# Patient Record
Sex: MALE | Race: BLACK OR AFRICAN AMERICAN | Employment: FULL TIME | URBAN - METROPOLITAN AREA
[De-identification: names, ages, dates, MRNs, and addresses within clinical notes are randomized per-mention and may not be internally consistent; named-entity substitution may affect disease eponyms.]

---

## 2021-11-06 ENCOUNTER — HOSPITAL ENCOUNTER (OUTPATIENT)
Age: 41
Setting detail: OBSERVATION
Discharge: HOME | End: 2021-11-08
Attending: EMERGENCY MEDICINE | Admitting: INTERNAL MEDICINE
Payer: COMMERCIAL

## 2021-11-06 ENCOUNTER — APPOINTMENT (OUTPATIENT)
Dept: GENERAL RADIOLOGY | Age: 41
End: 2021-11-06
Attending: EMERGENCY MEDICINE
Payer: COMMERCIAL

## 2021-11-06 DIAGNOSIS — R94.31 ABNORMAL EKG: ICD-10-CM

## 2021-11-06 DIAGNOSIS — E87.6 HYPOKALEMIA: Primary | ICD-10-CM

## 2021-11-06 DIAGNOSIS — R79.89 ELEVATED SERUM CREATININE: ICD-10-CM

## 2021-11-06 DIAGNOSIS — I16.0 HYPERTENSIVE URGENCY: ICD-10-CM

## 2021-11-06 LAB
BASOPHILS # BLD: 0 K/UL (ref 0–0.1)
BASOPHILS NFR BLD: 0 % (ref 0–1)
COMMENT, HOLDF: NORMAL
DIFFERENTIAL METHOD BLD: ABNORMAL
EOSINOPHIL # BLD: 0.1 K/UL (ref 0–0.4)
EOSINOPHIL NFR BLD: 2 % (ref 0–7)
ERYTHROCYTE [DISTWIDTH] IN BLOOD BY AUTOMATED COUNT: 13.9 % (ref 11.5–14.5)
HCT VFR BLD AUTO: 37.7 % (ref 36.6–50.3)
HGB BLD-MCNC: 11.8 G/DL (ref 12.1–17)
IMM GRANULOCYTES # BLD AUTO: 0 K/UL (ref 0–0.04)
IMM GRANULOCYTES NFR BLD AUTO: 0 % (ref 0–0.5)
LYMPHOCYTES # BLD: 3.1 K/UL (ref 0.8–3.5)
LYMPHOCYTES NFR BLD: 41 % (ref 12–49)
MCH RBC QN AUTO: 25.2 PG (ref 26–34)
MCHC RBC AUTO-ENTMCNC: 31.3 G/DL (ref 30–36.5)
MCV RBC AUTO: 80.4 FL (ref 80–99)
MONOCYTES # BLD: 0.8 K/UL (ref 0–1)
MONOCYTES NFR BLD: 10 % (ref 5–13)
NEUTS SEG # BLD: 3.6 K/UL (ref 1.8–8)
NEUTS SEG NFR BLD: 47 % (ref 32–75)
NRBC # BLD: 0 K/UL (ref 0–0.01)
NRBC BLD-RTO: 0 PER 100 WBC
PLATELET # BLD AUTO: 290 K/UL (ref 150–400)
PMV BLD AUTO: 10.5 FL (ref 8.9–12.9)
RBC # BLD AUTO: 4.69 M/UL (ref 4.1–5.7)
SAMPLES BEING HELD,HOLD: NORMAL
WBC # BLD AUTO: 7.5 K/UL (ref 4.1–11.1)

## 2021-11-06 PROCEDURE — 80053 COMPREHEN METABOLIC PANEL: CPT

## 2021-11-06 PROCEDURE — 96374 THER/PROPH/DIAG INJ IV PUSH: CPT

## 2021-11-06 PROCEDURE — 90715 TDAP VACCINE 7 YRS/> IM: CPT | Performed by: EMERGENCY MEDICINE

## 2021-11-06 PROCEDURE — 71046 X-RAY EXAM CHEST 2 VIEWS: CPT

## 2021-11-06 PROCEDURE — 74011250636 HC RX REV CODE- 250/636: Performed by: EMERGENCY MEDICINE

## 2021-11-06 PROCEDURE — 90471 IMMUNIZATION ADMIN: CPT

## 2021-11-06 PROCEDURE — 85025 COMPLETE CBC W/AUTO DIFF WBC: CPT

## 2021-11-06 PROCEDURE — 93005 ELECTROCARDIOGRAM TRACING: CPT

## 2021-11-06 PROCEDURE — 74011250637 HC RX REV CODE- 250/637: Performed by: EMERGENCY MEDICINE

## 2021-11-06 PROCEDURE — 84484 ASSAY OF TROPONIN QUANT: CPT

## 2021-11-06 PROCEDURE — 36415 COLL VENOUS BLD VENIPUNCTURE: CPT

## 2021-11-06 PROCEDURE — 99285 EMERGENCY DEPT VISIT HI MDM: CPT

## 2021-11-06 PROCEDURE — 83735 ASSAY OF MAGNESIUM: CPT

## 2021-11-06 RX ORDER — ERYTHROMYCIN 5 MG/G
OINTMENT OPHTHALMIC
Status: COMPLETED | OUTPATIENT
Start: 2021-11-06 | End: 2021-11-06

## 2021-11-06 RX ADMIN — TETANUS TOXOID, REDUCED DIPHTHERIA TOXOID AND ACELLULAR PERTUSSIS VACCINE, ADSORBED 0.5 ML: 5; 2.5; 8; 8; 2.5 SUSPENSION INTRAMUSCULAR at 22:44

## 2021-11-06 RX ADMIN — ERYTHROMYCIN: 5 OINTMENT OPHTHALMIC at 22:44

## 2021-11-07 ENCOUNTER — APPOINTMENT (OUTPATIENT)
Dept: ULTRASOUND IMAGING | Age: 41
End: 2021-11-07
Attending: INTERNAL MEDICINE
Payer: COMMERCIAL

## 2021-11-07 PROBLEM — I16.1 HYPERTENSIVE EMERGENCY: Status: ACTIVE | Noted: 2021-11-07

## 2021-11-07 LAB
ALBUMIN SERPL-MCNC: 4.2 G/DL (ref 3.5–5)
ALBUMIN/GLOB SERPL: 1.1 {RATIO} (ref 1.1–2.2)
ALP SERPL-CCNC: 73 U/L (ref 45–117)
ALT SERPL-CCNC: 24 U/L (ref 12–78)
ANION GAP SERPL CALC-SCNC: 5 MMOL/L (ref 5–15)
ANION GAP SERPL CALC-SCNC: 5 MMOL/L (ref 5–15)
APPEARANCE UR: CLEAR
AST SERPL-CCNC: 13 U/L (ref 15–37)
ATRIAL RATE: 70 BPM
BACTERIA URNS QL MICRO: NEGATIVE /HPF
BILIRUB SERPL-MCNC: 0.5 MG/DL (ref 0.2–1)
BILIRUB UR QL: NEGATIVE
BUN SERPL-MCNC: 17 MG/DL (ref 6–20)
BUN SERPL-MCNC: 22 MG/DL (ref 6–20)
BUN/CREAT SERPL: 12 (ref 12–20)
BUN/CREAT SERPL: 14 (ref 12–20)
CALCIUM SERPL-MCNC: 8.9 MG/DL (ref 8.5–10.1)
CALCIUM SERPL-MCNC: 9.1 MG/DL (ref 8.5–10.1)
CALCULATED P AXIS, ECG09: 65 DEGREES
CALCULATED R AXIS, ECG10: 68 DEGREES
CALCULATED T AXIS, ECG11: 17 DEGREES
CHLORIDE SERPL-SCNC: 107 MMOL/L (ref 97–108)
CHLORIDE SERPL-SCNC: 108 MMOL/L (ref 97–108)
CO2 SERPL-SCNC: 26 MMOL/L (ref 21–32)
CO2 SERPL-SCNC: 27 MMOL/L (ref 21–32)
COLOR UR: ABNORMAL
COMMENT, HOLDF: NORMAL
CREAT SERPL-MCNC: 1.37 MG/DL (ref 0.7–1.3)
CREAT SERPL-MCNC: 1.57 MG/DL (ref 0.7–1.3)
CREAT UR-MCNC: 134 MG/DL
DIAGNOSIS, 93000: NORMAL
EPITH CASTS URNS QL MICRO: ABNORMAL /LPF
GLOBULIN SER CALC-MCNC: 3.9 G/DL (ref 2–4)
GLUCOSE SERPL-MCNC: 102 MG/DL (ref 65–100)
GLUCOSE SERPL-MCNC: 85 MG/DL (ref 65–100)
GLUCOSE UR STRIP.AUTO-MCNC: NEGATIVE MG/DL
HGB UR QL STRIP: ABNORMAL
HYALINE CASTS URNS QL MICRO: ABNORMAL /LPF (ref 0–5)
KETONES UR QL STRIP.AUTO: NEGATIVE MG/DL
LEUKOCYTE ESTERASE UR QL STRIP.AUTO: NEGATIVE
MAGNESIUM SERPL-MCNC: 2.2 MG/DL (ref 1.6–2.4)
NITRITE UR QL STRIP.AUTO: NEGATIVE
P-R INTERVAL, ECG05: 144 MS
PH UR STRIP: 7 [PH] (ref 5–8)
POTASSIUM SERPL-SCNC: 3.2 MMOL/L (ref 3.5–5.1)
POTASSIUM SERPL-SCNC: 3.2 MMOL/L (ref 3.5–5.1)
PROT SERPL-MCNC: 8.1 G/DL (ref 6.4–8.2)
PROT UR STRIP-MCNC: ABNORMAL MG/DL
Q-T INTERVAL, ECG07: 400 MS
QRS DURATION, ECG06: 100 MS
QTC CALCULATION (BEZET), ECG08: 432 MS
RBC #/AREA URNS HPF: ABNORMAL /HPF (ref 0–5)
SAMPLES BEING HELD,HOLD: NORMAL
SODIUM SERPL-SCNC: 139 MMOL/L (ref 136–145)
SODIUM SERPL-SCNC: 139 MMOL/L (ref 136–145)
SODIUM UR-SCNC: 140 MMOL/L
SP GR UR REFRACTOMETRY: 1.01 (ref 1–1.03)
TROPONIN-HIGH SENSITIVITY: 29 NG/L (ref 0–76)
UR CULT HOLD, URHOLD: NORMAL
UROBILINOGEN UR QL STRIP.AUTO: 0.2 EU/DL (ref 0.2–1)
VENTRICULAR RATE, ECG03: 70 BPM
WBC URNS QL MICRO: ABNORMAL /HPF (ref 0–4)

## 2021-11-07 PROCEDURE — 65660000000 HC RM CCU STEPDOWN

## 2021-11-07 PROCEDURE — 84244 ASSAY OF RENIN: CPT

## 2021-11-07 PROCEDURE — 36415 COLL VENOUS BLD VENIPUNCTURE: CPT

## 2021-11-07 PROCEDURE — 74011250637 HC RX REV CODE- 250/637: Performed by: INTERNAL MEDICINE

## 2021-11-07 PROCEDURE — 76770 US EXAM ABDO BACK WALL COMP: CPT

## 2021-11-07 PROCEDURE — 82088 ASSAY OF ALDOSTERONE: CPT

## 2021-11-07 PROCEDURE — 74011250636 HC RX REV CODE- 250/636: Performed by: EMERGENCY MEDICINE

## 2021-11-07 PROCEDURE — 74011000250 HC RX REV CODE- 250: Performed by: FAMILY MEDICINE

## 2021-11-07 PROCEDURE — 82570 ASSAY OF URINE CREATININE: CPT

## 2021-11-07 PROCEDURE — 96366 THER/PROPH/DIAG IV INF ADDON: CPT

## 2021-11-07 PROCEDURE — 84300 ASSAY OF URINE SODIUM: CPT

## 2021-11-07 PROCEDURE — 74011250636 HC RX REV CODE- 250/636: Performed by: FAMILY MEDICINE

## 2021-11-07 PROCEDURE — 74011250637 HC RX REV CODE- 250/637: Performed by: EMERGENCY MEDICINE

## 2021-11-07 PROCEDURE — 96375 TX/PRO/DX INJ NEW DRUG ADDON: CPT

## 2021-11-07 PROCEDURE — 96365 THER/PROPH/DIAG IV INF INIT: CPT

## 2021-11-07 PROCEDURE — 81001 URINALYSIS AUTO W/SCOPE: CPT

## 2021-11-07 PROCEDURE — 96372 THER/PROPH/DIAG INJ SC/IM: CPT

## 2021-11-07 PROCEDURE — 74011250637 HC RX REV CODE- 250/637: Performed by: FAMILY MEDICINE

## 2021-11-07 PROCEDURE — 80048 BASIC METABOLIC PNL TOTAL CA: CPT

## 2021-11-07 RX ORDER — ENOXAPARIN SODIUM 100 MG/ML
40 INJECTION SUBCUTANEOUS DAILY
Status: DISCONTINUED | OUTPATIENT
Start: 2021-11-07 | End: 2021-11-08 | Stop reason: HOSPADM

## 2021-11-07 RX ORDER — SODIUM CHLORIDE 0.9 % (FLUSH) 0.9 %
5-40 SYRINGE (ML) INJECTION AS NEEDED
Status: DISCONTINUED | OUTPATIENT
Start: 2021-11-07 | End: 2021-11-08 | Stop reason: HOSPADM

## 2021-11-07 RX ORDER — HYDRALAZINE HYDROCHLORIDE 20 MG/ML
10 INJECTION INTRAMUSCULAR; INTRAVENOUS
Status: DISCONTINUED | OUTPATIENT
Start: 2021-11-07 | End: 2021-11-08 | Stop reason: HOSPADM

## 2021-11-07 RX ORDER — AMLODIPINE BESYLATE 5 MG/1
5 TABLET ORAL
Status: COMPLETED | OUTPATIENT
Start: 2021-11-07 | End: 2021-11-07

## 2021-11-07 RX ORDER — POTASSIUM CHLORIDE 750 MG/1
40 TABLET, FILM COATED, EXTENDED RELEASE ORAL
Status: COMPLETED | OUTPATIENT
Start: 2021-11-07 | End: 2021-11-07

## 2021-11-07 RX ORDER — AMLODIPINE BESYLATE 5 MG/1
10 TABLET ORAL DAILY
Status: DISCONTINUED | OUTPATIENT
Start: 2021-11-07 | End: 2021-11-07

## 2021-11-07 RX ORDER — CARVEDILOL 6.25 MG/1
6.25 TABLET ORAL 2 TIMES DAILY WITH MEALS
Status: DISCONTINUED | OUTPATIENT
Start: 2021-11-07 | End: 2021-11-07

## 2021-11-07 RX ORDER — HYDRALAZINE HYDROCHLORIDE 20 MG/ML
20 INJECTION INTRAMUSCULAR; INTRAVENOUS ONCE
Status: COMPLETED | OUTPATIENT
Start: 2021-11-07 | End: 2021-11-07

## 2021-11-07 RX ORDER — AMLODIPINE BESYLATE 5 MG/1
5 TABLET ORAL DAILY
Status: DISCONTINUED | OUTPATIENT
Start: 2021-11-07 | End: 2021-11-07

## 2021-11-07 RX ORDER — SODIUM CHLORIDE 0.9 % (FLUSH) 0.9 %
5-40 SYRINGE (ML) INJECTION EVERY 8 HOURS
Status: DISCONTINUED | OUTPATIENT
Start: 2021-11-07 | End: 2021-11-08 | Stop reason: HOSPADM

## 2021-11-07 RX ORDER — ONDANSETRON 2 MG/ML
4 INJECTION INTRAMUSCULAR; INTRAVENOUS
Status: DISCONTINUED | OUTPATIENT
Start: 2021-11-07 | End: 2021-11-08 | Stop reason: HOSPADM

## 2021-11-07 RX ORDER — ACETAMINOPHEN 325 MG/1
650 TABLET ORAL
Status: DISCONTINUED | OUTPATIENT
Start: 2021-11-07 | End: 2021-11-08 | Stop reason: HOSPADM

## 2021-11-07 RX ORDER — CARVEDILOL 12.5 MG/1
25 TABLET ORAL 2 TIMES DAILY WITH MEALS
Status: DISCONTINUED | OUTPATIENT
Start: 2021-11-07 | End: 2021-11-08 | Stop reason: HOSPADM

## 2021-11-07 RX ORDER — ACETAMINOPHEN 650 MG/1
650 SUPPOSITORY RECTAL
Status: DISCONTINUED | OUTPATIENT
Start: 2021-11-07 | End: 2021-11-08 | Stop reason: HOSPADM

## 2021-11-07 RX ORDER — HYDROCHLOROTHIAZIDE 25 MG/1
25 TABLET ORAL DAILY
Status: DISCONTINUED | OUTPATIENT
Start: 2021-11-07 | End: 2021-11-07

## 2021-11-07 RX ORDER — POTASSIUM CHLORIDE 750 MG/1
40 TABLET, FILM COATED, EXTENDED RELEASE ORAL DAILY
Status: COMPLETED | OUTPATIENT
Start: 2021-11-07 | End: 2021-11-08

## 2021-11-07 RX ORDER — ONDANSETRON 4 MG/1
4 TABLET, ORALLY DISINTEGRATING ORAL
Status: DISCONTINUED | OUTPATIENT
Start: 2021-11-07 | End: 2021-11-08 | Stop reason: HOSPADM

## 2021-11-07 RX ADMIN — POTASSIUM CHLORIDE 40 MEQ: 750 TABLET, FILM COATED, EXTENDED RELEASE ORAL at 00:26

## 2021-11-07 RX ADMIN — POTASSIUM CHLORIDE 40 MEQ: 750 TABLET, FILM COATED, EXTENDED RELEASE ORAL at 06:45

## 2021-11-07 RX ADMIN — POTASSIUM CHLORIDE 40 MEQ: 750 TABLET, FILM COATED, EXTENDED RELEASE ORAL at 13:11

## 2021-11-07 RX ADMIN — CARVEDILOL 6.25 MG: 6.25 TABLET, FILM COATED ORAL at 06:45

## 2021-11-07 RX ADMIN — CARVEDILOL 25 MG: 12.5 TABLET, FILM COATED ORAL at 17:26

## 2021-11-07 RX ADMIN — Medication 10 ML: at 13:12

## 2021-11-07 RX ADMIN — AMLODIPINE BESYLATE 5 MG: 5 TABLET ORAL at 00:26

## 2021-11-07 RX ADMIN — SODIUM CHLORIDE 5 MG/HR: 9 INJECTION, SOLUTION INTRAVENOUS at 18:38

## 2021-11-07 RX ADMIN — Medication 10 ML: at 06:00

## 2021-11-07 RX ADMIN — Medication 10 ML: at 22:00

## 2021-11-07 RX ADMIN — ENOXAPARIN SODIUM 40 MG: 100 INJECTION SUBCUTANEOUS at 08:44

## 2021-11-07 RX ADMIN — SODIUM CHLORIDE 5 MG/HR: 9 INJECTION, SOLUTION INTRAVENOUS at 12:00

## 2021-11-07 RX ADMIN — HYDRALAZINE HYDROCHLORIDE 20 MG: 20 INJECTION INTRAMUSCULAR; INTRAVENOUS at 00:50

## 2021-11-07 RX ADMIN — SODIUM CHLORIDE 0.5 MG/HR: 9 INJECTION, SOLUTION INTRAVENOUS at 07:00

## 2021-11-07 NOTE — ED PROVIDER NOTES
HPI   41 yo M presents with hypertension. Pt went to [facility] due to sawdust in eye. BP noted to be elevated. Sent to ER for further evaluation. Denies cp, sob, nausea, vomiting, diarrhea, fever, cough. Noted to have corneal abrasion on exam and given erythromycin ointment. Pt does not wear glasses or contacts. Unknown tetanus status. No past medical history on file. No past surgical history on file. No family history on file. Social History     Socioeconomic History    Marital status:      Spouse name: Not on file    Number of children: Not on file    Years of education: Not on file    Highest education level: Not on file   Occupational History    Not on file   Tobacco Use    Smoking status: Never Smoker    Smokeless tobacco: Not on file   Substance and Sexual Activity    Alcohol use: Yes     Comment: social    Drug use: Never    Sexual activity: Not on file   Other Topics Concern    Not on file   Social History Narrative    Not on file     Social Determinants of Health     Financial Resource Strain:     Difficulty of Paying Living Expenses: Not on file   Food Insecurity:     Worried About Running Out of Food in the Last Year: Not on file    Dejah of Food in the Last Year: Not on file   Transportation Needs:     Lack of Transportation (Medical): Not on file    Lack of Transportation (Non-Medical):  Not on file   Physical Activity:     Days of Exercise per Week: Not on file    Minutes of Exercise per Session: Not on file   Stress:     Feeling of Stress : Not on file   Social Connections:     Frequency of Communication with Friends and Family: Not on file    Frequency of Social Gatherings with Friends and Family: Not on file    Attends [employer] Services: Not on file    Active Member of Clubs or Organizations: Not on file    Attends Club or Organization Meetings: Not on file    Marital Status: Not on file   Intimate Partner Violence:     Fear of Current or Ex-Partner: Not on file    Emotionally Abused: Not on file    Physically Abused: Not on file    Sexually Abused: Not on file   Housing Stability:     Unable to Pay for Housing in the Last Year: Not on file    Number of Places Lived in the Last Year: Not on file    Unstable Housing in the Last Year: Not on file         ALLERGIES: Patient has no known allergies. Review of Systems   Constitutional: Negative for chills and fever. Eyes: Positive for pain. Respiratory: Negative for cough and shortness of breath. Cardiovascular: Negative for chest pain. Gastrointestinal: Negative for abdominal pain, constipation, diarrhea, nausea and vomiting. Genitourinary: Negative for dysuria. Musculoskeletal: Negative for back pain. Skin: Negative for rash. Neurological: Negative for headaches. All other systems reviewed and are negative.       Vitals:    11/06/21 2047   BP: (!) 231/139   Pulse: 75   Resp: 18   Temp: 98 °F (36.7 °C)   SpO2: 99%   Weight: 117 kg (258 lb)   Height: 5' 7\" (1.702 m)            Physical Exam   Physical Examination: General appearance - alert, well appearing, and in no distress, oriented to person, place, and time and normal appearing weight  Eyes - pupils equal and reactive, extraocular eye movements intact  Neck - supple, no significant adenopathy  Chest - clear to auscultation, no wheezes, rales or rhonchi, symmetric air entry  Heart - normal rate, regular rhythm, normal S1, S2, no murmurs, rubs, clicks or gallops  Abdomen - soft, nontender, nondistended, no masses or organomegaly  Back exam - full range of motion, no tenderness, palpable spasm or pain on motion  Neurological - alert, oriented, normal speech, no focal findings or movement disorder noted  Musculoskeletal - no joint tenderness, deformity or swelling  Extremities - peripheral pulses normal, no pedal edema, no clubbing or cyanosis  Skin - normal coloration and turgor, no rashes, no suspicious skin lesions noted  MDM  Number of Diagnoses or Management Options     Amount and/or Complexity of Data Reviewed  Clinical lab tests: ordered and reviewed  Tests in the radiology section of CPT®: ordered and reviewed  Discuss the patient with other providers: yes (hospitalist)  Independent visualization of images, tracings, or specimens: yes    Patient Progress  Patient progress: stable         Procedures  ED EKG interpretation:  Rhythm: normal sinus rhythm; and regular . Rate (approx.): 70; Axis: normal; P wave: normal; QRS interval: normal ; ST/T wave: non-specific, lateral t wave inversion  EKG documented by Phil Fraga MD    Perfect Serve Consult for Admission  12:49 AM    ED Room Number: ER13/13  Patient Name and age:  Aury Beth 42 y.o.  male  Working Diagnosis:   1. Hypokalemia    2. Elevated serum creatinine    3. Abnormal EKG    4. Hypertensive urgency        COVID-19 Suspicion:  no  Sepsis present:  no  Reassessment needed: no  Code Status:  Full Code  Readmission: no  Isolation Requirements:  no  Recommended Level of Care:  telemetry  Department:[facility] ED - 21     12:49 AM  Updated wife and patient on test results and plan for admission due to hypertensive urgency, abnormal EKG, mildly elevated troponin and creatinine. Patient does not have follow-up outpatient. He needs further evaluation and treatment.     Total critical care time spent exclusive of procedures:  35 min

## 2021-11-07 NOTE — H&P
[address] [facility] Adult  Hospitalist Group  History and Physical    Primary Care Provider: None  Date of Service:  11/7/2021    Subjective:     Carlito Blackmon is a previously healthy 42 y.o. male who was sent from urgent care for elevated BP, and is being admitted for hypertensive emergency. He initially presented to urgent care for eye injury caused by sawing with no eye protection and sustaining a corneal injury that was treated with erythromycin gtt. BP was incidentally noted to be elevated. He denies CP, shortness of breath, focal neurologic deficit, headache, blurred vision, or change in urine output. He does endorse recent use of NSAID's for a pulled muscle. In the ED, SBP was elevated to 231. Labs were significant for K+ 3.2, creatinine 1.57, and UA with trace blood and protein. In the ED, he received   Hydralazine, norvasc 5, K+ and tetanus    Review of Systems:    All other review of systems were negative except for that written in the History of Present Illness. PMhx obesity  No past surgical history on file. Prior to Admission medications    Not on File     No Known Allergies     Family hx of father with HTN    SOCIAL HISTORY:  Patient resides at Home. Patient ambulates independently  Smoking history: none  Alcohol history: {none    Objective:       Physical Exam:   Visit Vitals  BP (!) 173/102   Pulse 68   Temp 98.4 °F (36.9 °C)   Resp 15   Ht 5' 7\" (1.702 m)   Wt 117 kg (258 lb)   SpO2 97%   BMI 40.41 kg/m²     General:  Alert, cooperative, no distress, appears stated age. Head:  Normocephalic, without obvious abnormality, atraumatic. Eyes:  Conjunctivae/corneas clear. PERRL, EOMs intact. Ears:  Normal TMs and external ear canals both ears. Nose: Nares normal. Septum midline. Throat: Lips, mucosa, and tongue normal.    Neck: Supple, symmetrical, trachea midline   Back:   Symmetric, no curvature. ROM normal. .   Lungs:   Clear to auscultation bilaterally.    Chest wall:  No tenderness or deformity. Heart:  Regular rate and rhythm, S1, S2 normal, no murmur, click, rub or gallop. Abdomen:   Soft, non-tender. Bowel sounds normal.            Extremities: Extremities normal, atraumatic, no cyanosis or edema. Pulses: 2+ radial pulses           Neurologic: CNII-XII intact. Normal strength, sensation and reflexes throughout. ECG: NSR, lateral T wave inversions    Data Review: All diagnostic labs and studies have been reviewed. Chest x-ray: normal CXR  Assessment:     Active Problems:    * No active hospital problems. *      Plan:     #Hypertensive Emergency  #Abnormal EKG  #ALIYAH  - upon arrival with lateral EKG changes, and ALIYAH.  rec'd hydralazine IV, and norvasc 5 with no improvement in BP  -start cardene gtt  -add carvedilol, continue norvasc  -avoid NSAID's, ACE/ARB, diuretics at this time 2/2 ALIYAH    Fen: cardiac  dvt ppx: lovenox  MPOA: [employer] [place] (spouse)  Code: Full    FUNCTIONAL STATUS PRIOR TO HOSPITALIZATION Ambulates Independently    Signed By: Susan Lew MD     November 7, 2021

## 2021-11-07 NOTE — ROUTINE PROCESS
TRANSFER - OUT REPORT:    Verbal report given to 4E RN(name) on Stevon Callands  being transferred to 4E(unit) for routine progression of care       Report consisted of patients Situation, Background, Assessment and   Recommendations(SBAR). Information from the following report(s) SBAR, Kardex, ED Summary, [facility] and Recent Results was reviewed with the receiving nurse. Lines:   Peripheral IV 11/06/21 Left Antecubital (Active)   Site Assessment Clean, dry, & intact 11/06/21 2333   Phlebitis Assessment 0 11/06/21 2333   Infiltration Assessment 0 11/06/21 2333   Dressing Status Clean, dry, & intact 11/06/21 2333   Dressing Type Transparent 11/06/21 2333        Opportunity for questions and clarification was provided.       Patient transported with:   Monitor  Registered Nurse

## 2021-11-07 NOTE — ED TRIAGE NOTES
Triage note: pt when to [facility] for saw dust in his eye and noted to have elevated B/p. Pt with no history of HTN. Pt denies symptoms.      Left arm - 231/139   Right arm - 221/119

## 2021-11-07 NOTE — PROGRESS NOTES
Hospitalist Progress Note  Zoraida Haile MD  Answering service: [phone] from in house phone      Date of Service:  2021  NAME:  Ishmael Rowe  :  1980  MRN:  [id number]      Admission Summary:   Ishmael Rowe is a previously healthy 42 y.o. male who was sent from urgent care for elevated BP, and is being admitted for hypertensive emergency. He initially presented to urgent care for eye injury caused by sawing with no eye protection and sustaining a corneal injury that was treated with erythromycin gtt. BP was incidentally noted to be elevated. He denies CP, shortness of breath, focal neurologic deficit, headache, blurred vision, or change in urine output. He does endorse recent use of NSAID's for a pulled muscle.     In the ED, SBP was elevated to 231. Labs were significant for K+ 3.2, creatinine 1.57, and UA with trace blood and protein.     In the ED, he received   Hydralazine, norvasc 5, K+ and tetanus    Interval history / Subjective:      Patient seen and examined today. Patient stated he father used to take high blood pressure medication otherwise denied any other family member. He denied illicit drug use. He did mentioned that he was taking Aleve multiple times a day ( may be too much) last few one week due to back pain. Assessment & Plan:     # Hypertensive Emergency  Pt present with elevated blood pressure in the 200's with hypokalemia--  R/o primary hyperaldosteronism   - on cardene gtt  - blood pressure looks better  - adjust carvedilol 25mg BID and attempt to wean off the cardene   - check renal US, PAC, PRA and calculate PAC/PRA ratio     # ALIYAH Vs CKD  Don't have baseline Cr or renal function.  Possible NSAID induced aliyah   - avoid NSAID's, ACE/ARB, diuretics   - obtain urine lytes   - renal US   - will consider nephrology consult     # Abnormal EKG  - no cp  - troponin negative   - EKG showing incomplete RBBB, T wave ab possible lateral ischemia   - obtain Echo     # Hypokalemia: replete as needed   # Obese      Fen: cardiac  dvt ppx: lovenox  MPOA: Konstantin Vaca (spouse)  Code: Full        Hospital Problems  Never Reviewed          Codes Class Noted POA    Hypertensive emergency ICD-10-CM: I16.1  ICD-9-CM: 401.9  11/7/2021 Unknown                Review of Systems:   Pertinent positive mentioned in interval hx/HPI. Other systems reviewed and negative     Vital Signs:    Last 24hrs VS reviewed since prior progress note. Most recent are:  Visit Vitals  BP (!) 150/91   Pulse 77   Temp 98.5 °F (36.9 °C)   Resp 22   Ht 5' 7\" (1.702 m)   Wt 117 kg (258 lb)   SpO2 95%   BMI 40.41 kg/m²       No intake or output data in the 24 hours ending 11/07/21 1133     Physical Examination:             Constitutional:  No acute distress, cooperative, pleasant    ENT:  Oral mucous moist, oropharynx benign. Neck supple,    Resp:  CTA bilaterally. No wheezing/rhonchi/rales. No accessory muscle use   CV:  Regular rhythm, normal rate, no murmurs, gallops, rubs    GI:  Soft, non distended, non tender. normoactive bowel sounds, no hepatosplenomegaly     Musculoskeletal:  No edema, warm, 2+ pulses throughout    Neurologic:  Moves all extremities. AAOx3, CN II-XII reviewed            Data Review:    I personally reviewed labs and imaging     Labs:     Recent Labs     11/06/21  2318   WBC 7.5   HGB 11.8*   HCT 37.7        Recent Labs     11/07/21  0333 11/06/21  2318    139   K 3.2* 3.2*    107   CO2 26 27   BUN 17 22*   CREA 1.37* 1.57*   * 85   CA 8.9 9.1   MG  --  2.2     Recent Labs     11/06/21  2318   ALT 24   AP 73   TBILI 0.5   TP 8.1   ALB 4.2   GLOB 3.9     No results for input(s): INR, PTP, APTT, INREXT in the last 72 hours. No results for input(s): FE, TIBC, PSAT, FERR in the last 72 hours. No results found for: FOL, RBCF   No results for input(s): PH, PCO2, PO2 in the last 72 hours.   No results for input(s): CPK, CKNDX, TROIQ in the last 72 hours.     No lab exists for component: CPKMB  No results found for: CHOL, CHOLX, CHLST, CHOLV, HDL, HDLP, LDL, LDLC, DLDLP, TGLX, TRIGL, TRIGP, CHHD, CHHDX  No results found for: [facility]  Lab Results   Component Value Date/Time    Color YELLOW/STRAW 11/07/2021 12:40 AM    Appearance CLEAR 11/07/2021 12:40 AM    Specific gravity 1.014 11/07/2021 12:40 AM    pH (UA) 7.0 11/07/2021 12:40 AM    Protein TRACE (A) 11/07/2021 12:40 AM    Glucose Negative 11/07/2021 12:40 AM    Ketone Negative 11/07/2021 12:40 AM    Bilirubin Negative 11/07/2021 12:40 AM    Urobilinogen 0.2 11/07/2021 12:40 AM    Nitrites Negative 11/07/2021 12:40 AM    Leukocyte Esterase Negative 11/07/2021 12:40 AM    Epithelial cells FEW 11/07/2021 12:40 AM    Bacteria Negative 11/07/2021 12:40 AM    WBC 0-4 11/07/2021 12:40 AM    RBC 5-10 11/07/2021 12:40 AM         Medications Reviewed:     Current Facility-Administered Medications   Medication Dose Route Frequency    sodium chloride (NS) flush 5-40 mL  5-40 mL IntraVENous Q8H    sodium chloride (NS) flush 5-40 mL  5-40 mL IntraVENous PRN    acetaminophen (TYLENOL) tablet 650 mg  650 mg Oral Q6H PRN    Or    acetaminophen (TYLENOL) suppository 650 mg  650 mg Rectal Q6H PRN    ondansetron (ZOFRAN ODT) tablet 4 mg  4 mg Oral Q8H PRN    Or    ondansetron (ZOFRAN) injection 4 mg  4 mg IntraVENous Q6H PRN    enoxaparin (LOVENOX) injection 40 mg  40 mg SubCUTAneous DAILY    hydrALAZINE (APRESOLINE) 20 mg/mL injection 10 mg  10 mg IntraVENous Q6H PRN    carvediloL (COREG) tablet 6.25 mg  6.25 mg Oral BID WITH MEALS    niCARdipine (CARDENE) 25 mg in 0.9% sodium chloride 250 mL infusion  0-15 mg/hr IntraVENous TITRATE     ______________________________________________________________________  EXPECTED LENGTH OF STAY: - - -  ACTUAL LENGTH OF STAY:          0                 Diane Mena MD   Patient has given Verbal permission to discuss medical care with   persons present in the room and and also with contact as listed on face sheet. Please note that this dictation was completed with [email or web address], the computer voice recognition software. Quite often unanticipated grammatical, syntax, homophones, and other interpretive errors are inadvertently transcribed by the computer software. Please disregard these errors. Please excuse any errors that have escaped final proofreading. Thank you.

## 2021-11-08 VITALS
SYSTOLIC BLOOD PRESSURE: 137 MMHG | OXYGEN SATURATION: 92 % | BODY MASS INDEX: 40.49 KG/M2 | DIASTOLIC BLOOD PRESSURE: 98 MMHG | WEIGHT: 258 LBS | TEMPERATURE: 98.5 F | HEIGHT: 67 IN | HEART RATE: 64 BPM | RESPIRATION RATE: 19 BRPM

## 2021-11-08 LAB
ANION GAP SERPL CALC-SCNC: 6 MMOL/L (ref 5–15)
BUN SERPL-MCNC: 23 MG/DL (ref 6–20)
BUN/CREAT SERPL: 14 (ref 12–20)
CALCIUM SERPL-MCNC: 8.7 MG/DL (ref 8.5–10.1)
CHLORIDE SERPL-SCNC: 109 MMOL/L (ref 97–108)
CO2 SERPL-SCNC: 22 MMOL/L (ref 21–32)
CREAT SERPL-MCNC: 1.6 MG/DL (ref 0.7–1.3)
GLUCOSE SERPL-MCNC: 100 MG/DL (ref 65–100)
POTASSIUM SERPL-SCNC: 3.6 MMOL/L (ref 3.5–5.1)
SODIUM SERPL-SCNC: 137 MMOL/L (ref 136–145)

## 2021-11-08 PROCEDURE — 94760 N-INVAS EAR/PLS OXIMETRY 1: CPT

## 2021-11-08 PROCEDURE — G0378 HOSPITAL OBSERVATION PER HR: HCPCS

## 2021-11-08 PROCEDURE — 96372 THER/PROPH/DIAG INJ SC/IM: CPT

## 2021-11-08 PROCEDURE — 36415 COLL VENOUS BLD VENIPUNCTURE: CPT

## 2021-11-08 PROCEDURE — 74011250636 HC RX REV CODE- 250/636: Performed by: FAMILY MEDICINE

## 2021-11-08 PROCEDURE — 74011250637 HC RX REV CODE- 250/637: Performed by: INTERNAL MEDICINE

## 2021-11-08 PROCEDURE — 74011000250 HC RX REV CODE- 250: Performed by: FAMILY MEDICINE

## 2021-11-08 PROCEDURE — 96366 THER/PROPH/DIAG IV INF ADDON: CPT

## 2021-11-08 PROCEDURE — 80048 BASIC METABOLIC PNL TOTAL CA: CPT

## 2021-11-08 RX ORDER — AMLODIPINE BESYLATE 5 MG/1
10 TABLET ORAL DAILY
Status: DISCONTINUED | OUTPATIENT
Start: 2021-11-08 | End: 2021-11-08 | Stop reason: HOSPADM

## 2021-11-08 RX ORDER — CARVEDILOL 25 MG/1
25 TABLET ORAL 2 TIMES DAILY WITH MEALS
Qty: 60 TABLET | Refills: 0 | Status: SHIPPED | OUTPATIENT
Start: 2021-11-08 | End: 2021-12-08

## 2021-11-08 RX ORDER — AMLODIPINE BESYLATE 10 MG/1
10 TABLET ORAL DAILY
Qty: 30 TABLET | Refills: 0 | Status: SHIPPED | OUTPATIENT
Start: 2021-11-09 | End: 2021-12-09

## 2021-11-08 RX ADMIN — SODIUM CHLORIDE 5 MG/HR: 9 INJECTION, SOLUTION INTRAVENOUS at 06:29

## 2021-11-08 RX ADMIN — AMLODIPINE BESYLATE 10 MG: 5 TABLET ORAL at 09:56

## 2021-11-08 RX ADMIN — CARVEDILOL 25 MG: 12.5 TABLET, FILM COATED ORAL at 17:24

## 2021-11-08 RX ADMIN — CARVEDILOL 25 MG: 12.5 TABLET, FILM COATED ORAL at 09:57

## 2021-11-08 RX ADMIN — SODIUM CHLORIDE 5 MG/HR: 9 INJECTION, SOLUTION INTRAVENOUS at 01:37

## 2021-11-08 RX ADMIN — POTASSIUM CHLORIDE 40 MEQ: 750 TABLET, FILM COATED, EXTENDED RELEASE ORAL at 09:57

## 2021-11-08 RX ADMIN — SODIUM CHLORIDE 2.5 MG/HR: 9 INJECTION, SOLUTION INTRAVENOUS at 11:54

## 2021-11-08 RX ADMIN — ENOXAPARIN SODIUM 40 MG: 100 INJECTION SUBCUTANEOUS at 09:57

## 2021-11-08 RX ADMIN — Medication 10 ML: at 06:30

## 2021-11-08 RX ADMIN — Medication 10 ML: at 15:05

## 2021-11-08 NOTE — PROGRESS NOTES
Bedside shift change report given to Alba (oncoming nurse) by Jonatan Trivedi (offgoing nurse). Report included the following information SBAR, Kardex, ED Summary, MAR, Recent Results and Cardiac Rhythm NSR. Problem: Falls - Risk of  Goal: *Absence of Falls  Description: Document Severo Alfredo Fall Risk and appropriate interventions in the flowsheet.   11/7/2021 2330 by Junaid Severe  Outcome: Progressing Towards Goal  Note: Fall Risk Interventions:            Medication Interventions: Evaluate medications/consider consulting pharmacy, Patient to call before getting OOB, Teach patient to arise slowly                11/7/2021 2329 by Junaid Severe  Outcome: Progressing Towards Goal  Note: Fall Risk Interventions:            Medication Interventions: Evaluate medications/consider consulting pharmacy, Patient to call before getting OOB, Teach patient to arise slowly

## 2021-11-08 NOTE — CONSULTS
NEPHROLOGY CONSULT NOTE     Patient: Anastasiia Winter MRN: [id number]  PCP: None   :     1980  Age:   42 y.o. Sex:  male      Referring physician: Rigoberto Frost MD  Reason for consultation: 42 y.o. male with Hypertensive emergency [T56.8] complicated by ALIYAH   Admission Date: 2021  9:53 PM  LOS: 1 day      ASSESSMENT and PLAN :   Hypertensive emergency:  - no issues on U/S  - agree w/ current meds  - wean cardene as able  - karolyn/renin level pending    CKD 3a:  - suspect CKD from chronic HTN rather than ALIYAH  - U/S w/o issues  - renal U/S bland  - check PCR today  - suspect he is at his baseline    Hypokalemia:  - replete PRN  - hyperaldo w/u as above     Active Problems / Assessment AAActive  : Active Problems:    Hypertensive emergency (2021)         Subjective:   HPI: Anastasiia Winter is a 42 y.o.  male who has been admitted to the hospital for elevated BP. PResented to urgent care for eye injury, found to have elevated BP. Labs showed elevated Cr 1.6, low K of 3.2. He was transferred to [facility] and started on cardene drip. Cr stable. BP better now. Renal U/S showed symmetric kidneys, no other issues. Claims to use occassional NSAID. Denies cocaine use or other illicit drug use. Started on coreg, amlodipine. Denies cp, sob, n/vd/, HAs. Past Medical Hx:   History reviewed. No pertinent past medical history. Past Surgical Hx:   History reviewed. No pertinent surgical history. Medications:  Prior to Admission medications    Not on File       No Known Allergies    Social Hx:  reports that he has never smoked. He does not have any smokeless tobacco history on file. He reports current alcohol use. He reports that he does not use drugs. History reviewed. No pertinent family history. Review of Systems:  A twelve point review of system was performed today. Pertinent positives and negatives are mentioned in the HPI.  The reminder of the ROS is negative and noncontributory. Objective:    Vitals:    Vitals:    11/08/21 1004 11/08/21 1130 11/08/21 1154 11/08/21 1206   BP:  (!) 153/84 (!) 145/87    Pulse: 86 65 73 70   Resp:  18     Temp:       SpO2:  90%     Weight:       Height:         I&O's:  No intake/output data recorded. Visit Vitals  BP (!) 145/87   Pulse 70   Temp 98.7 °F (37.1 °C)   Resp 18   Ht 5' 7\" (1.702 m)   Wt 117 kg (258 lb)   SpO2 90%   BMI 40.41 kg/m²       Physical Exam:  General:Alert, No distress,   HEENT: Eyes are PERRL. Conjunctiva without pallor ,erythema. The sclerae without icterus. .   Neck:Supple,no mass palpable  Lungs : Clears to auscultation Bilaterally, Normal respiratory effort  CVS: RRR, S1 S2 normal, No rub, no LE edema  Abdomen: Soft, Non tender, No hepatosplenomegaly, bowel sounds present  Extremities: No cyanosis, No clubbing  Skin: No rash or lesions.   Lymph nodes: No palpable nodes  MS: No joint swelling, erythema, warmth  Neurologic: non focal, AAO x 3  Psych: normal affect    Laboratory Results:    Lab Results   Component Value Date    BUN 23 (H) 11/08/2021     11/08/2021    K 3.6 11/08/2021     (H) 11/08/2021    CO2 22 11/08/2021       Lab Results   Component Value Date    BUN 23 (H) 11/08/2021    BUN 17 11/07/2021    BUN 22 (H) 11/06/2021    K 3.6 11/08/2021    K 3.2 (L) 11/07/2021    K 3.2 (L) 11/06/2021       Lab Results   Component Value Date    WBC 7.5 11/06/2021    RBC 4.69 11/06/2021    HGB 11.8 (L) 11/06/2021    HCT 37.7 11/06/2021    MCV 80.4 11/06/2021    MCH 25.2 (L) 11/06/2021    RDW 13.9 11/06/2021     11/06/2021       No results found for: PTH, PHOS    Urine dipstick:   Lab Results   Component Value Date/Time    Color YELLOW/STRAW 11/07/2021 12:40 AM    Appearance CLEAR 11/07/2021 12:40 AM    Specific gravity 1.014 11/07/2021 12:40 AM    pH (UA) 7.0 11/07/2021 12:40 AM    Protein TRACE (A) 11/07/2021 12:40 AM    Glucose Negative 11/07/2021 12:40 AM    Ketone Negative 11/07/2021 12:40 AM Bilirubin Negative 11/07/2021 12:40 AM    Urobilinogen 0.2 11/07/2021 12:40 AM    Nitrites Negative 11/07/2021 12:40 AM    Leukocyte Esterase Negative 11/07/2021 12:40 AM    Epithelial cells FEW 11/07/2021 12:40 AM    Bacteria Negative 11/07/2021 12:40 AM    WBC 0-4 11/07/2021 12:40 AM    RBC 5-10 11/07/2021 12:40 AM           Thank you for allowing us to participate in the care of this patient. We will follow patient. Please dont hesitate to call with any questions    Enzo Mendenhall MD  11/8/2021      [facility] [address]  Phone - [phone]   Fax - [phone]  [email or web address]

## 2021-11-08 NOTE — PROGRESS NOTES
Reason for Admission:  Admitted from home with hypertensive crisis. No significant medical or surgical history. RUR Score: 6                    Plan for utilizing home health: Independent with all ADLs no DME      PCP: will need follow up with new PCP at discharge                  Current Advanced Directive/Advance Care Plan: Full Code  Healthcare Decision Maker:         Primary Decision Maker: Corine Pickard - Spouse - [phone]                  Transition of Care Plan:    Once medically stable will likely discharge home with family and no skilled needs. Wife can provide transport.   Care Management Interventions  PCP Verified by CM:  (Will need scheduled follow up with new PCP)  Palliative Care Criteria Met (RRAT>21 & CHF Dx)?: No  Support Systems: Spouse/Significant Other  The Patient and/or Patient Representative was Provided with a Choice of Provider and Agrees with the Discharge Plan?:  (Karenica-wife)  [address] Provided?: No  Discharge Location  Discharge Placement: Home with family assistance

## 2021-11-08 NOTE — PROGRESS NOTES
1823: Pt given discharge paperwork. Reviewed med updates, BEFAST and After Visit Report. Discussed follow-up visits and informed pt of where and how to get meds. Removed lines/leads, packed pt belongings and wheeled to discharge.

## 2021-11-08 NOTE — DISCHARGE SUMMARY
Discharge Summary       PATIENT ID: Negrita Madrid  MRN: [id number]   YOB: 1980    DATE OF ADMISSION: 11/6/2021  9:53 PM    DATE OF DISCHARGE: 11/08/21  PRIMARY CARE PROVIDER: None       DISCHARGING PROVIDER: Adiel Jaffe MD    To contact this individual call [phone] and ask the  to page. If unavailable ask to be transferred the Adult Hospitalist Department. CONSULTATIONS: IP CONSULT TO HOSPITALIST  IP CONSULT TO NEPHROLOGY      PROCEDURES/SURGERIES: * No surgery found *    IMAGING  XR CHEST PA LAT    Result Date: 11/6/2021  Normal chest.    US RETROPERITONEUM COMP    Result Date: 11/7/2021  1. No hydronephrosis. 2. Hepatic steatosis. ADMITTING DIAGNOSES & HOSPITAL COURSE:   HPI on admission   Negrita Madrid is a previously healthy 42 y.o. male who was sent from urgent care for elevated BP, and is being admitted for hypertensive emergency. He initially presented to urgent care for eye injury caused by sawing with no eye protection and sustaining a corneal injury that was treated with erythromycin gtt. BP was incidentally noted to be elevated. He denies CP, shortness of breath, focal neurologic deficit, headache, blurred vision, or change in urine output. He does endorse recent use of NSAID's for a pulled muscle. In the ED, SBP was elevated to 231. Labs were significant for K+ 3.2, creatinine 1.57, and UA with trace blood and protein. In the ED, he received   Hydralazine, norvasc 5, K+ and tetanus    # Hypertensive Emergency  Pt present with elevated blood pressure in the 200's with hypokalemia--  R/o primary hyperaldosteronism   - on admission he was started on cardene gtt. Once better controlled achieved he was transitioned to carvedilol 25mg BID and amlodipine 10mg daily. Then, he was weaned off the cardene gtt.    - PAC, PRA and calculate PAC/PRA ratio pending during discharge      # ALIYAH Vs CKD  Don't have baseline Cr or renal function. Possible NSAID induced mimi   - avoid NSAID's, ACE/ARB, diuretics   - renal US normal   - Nephrology input appreciated      # Abnormal EKG  - no cp  - troponin negative   - EKG showing incomplete RBBB, T wave ab possible lateral ischemia      # Hypokalemia: replaced as needed   # Obese        DISCHARGE DIAGNOSES / PLAN:    # Hypertensive Emergency   # Acute kidney injury Vs Chronic kidney disease   # Abnormal EKG  # Hypokalemia  # Obese     - Please measure your blood pressure at home and keep a record to show to your primary care doctor or Nephrology     Patient Active Problem List   Diagnosis Code    Hypertensive emergency I16.1               PENDING TEST RESULTS:   At the time of discharge the following test results are still pending: PAC, PRA level     FOLLOW UP APPOINTMENTS:    Follow-up Information     Follow up With Specialties Details Why Contact Info    Darren Nickerson MD Cardio Vascular Surgery, Clinical Cardiac Electrophysiology Schedule an appointment as soon as possible for a visit  [address] [address] [place] [address]      Heidi Simental MD Nephrology Schedule an appointment as soon as possible for a visit in 2 weeks Follow up  [facility]  [phone]      Primary care doctor   Schedule an appointment as soon as possible for a visit in 2 weeks Follow up and medication refill             ADDITIONAL CARE RECOMMENDATIONS:   · It is important that you take the medication exactly as they are prescribed. · Keep your medication in the bottles provided by the pharmacist and keep a list of the medication names, dosages, and times to be taken in your wallet. · Do not take other medications without consulting your doctor. · No drinking alcohol or driving car or operating machinery if you are on narcotic pain medications. Donot take sedating mediations if you are sleepy or confused.    · Fall Precautions  · Keep Well Hydrated  · Report to your medical provider if you feel you have  developed allergies to medications  · Follow up with your PCP or Consultant for medication adjustments and refills  · Monitor for signs of fevers,chills,bleeding,chest pain and seek medical attention if you do so. DIET: Low salt diet     ACTIVITY: As tolerated     WOUND CARE: None     EQUIPMENT needed: None      DISCHARGE MEDICATIONS:  Current Discharge Medication List      START taking these medications    Details   amLODIPine (NORVASC) 10 mg tablet Take 1 Tablet by mouth daily for 30 days. Qty: 30 Tablet, Refills: 0  Start date: 11/9/2021, End date: 12/9/2021      carvediloL (COREG) 25 mg tablet Take 1 Tablet by mouth two (2) times daily (with meals) for 30 days. Qty: 60 Tablet, Refills: 0  Start date: 11/8/2021, End date: 12/8/2021             All Micro Results     Procedure Component Value Units Date/Time    URINE CULTURE HOLD SAMPLE [[id number]] Collected: 11/07/21 1843    Order Status: Completed Specimen: Serum Updated: 11/07/21 1859     Urine culture hold       Urine on hold in Microbiology dept for 2 days. If unpreserved urine is submitted, it cannot be used for addtional testing after 24 hours, recollection will be required. Recent Results (from the past 24 hour(s))   SODIUM, UR, RANDOM    Collection Time: 11/07/21  6:43 PM   Result Value Ref Range    Sodium,urine random 140 MMOL/L   CREATININE, UR, RANDOM    Collection Time: 11/07/21  6:43 PM   Result Value Ref Range    Creatinine, urine 134.00 mg/dL   URINE CULTURE HOLD SAMPLE    Collection Time: 11/07/21  6:43 PM    Specimen: Serum   Result Value Ref Range    Urine culture hold        Urine on hold in Microbiology dept for 2 days. If unpreserved urine is submitted, it cannot be used for addtional testing after 24 hours, recollection will be required.    METABOLIC PANEL, BASIC    Collection Time: 11/08/21  3:34 AM   Result Value Ref Range Sodium 137 136 - 145 mmol/L    Potassium 3.6 3.5 - 5.1 mmol/L    Chloride 109 (H) 97 - 108 mmol/L    CO2 22 21 - 32 mmol/L    Anion gap 6 5 - 15 mmol/L    Glucose 100 65 - 100 mg/dL    BUN 23 (H) 6 - 20 MG/DL    Creatinine 1.60 (H) 0.70 - 1.30 MG/DL    BUN/Creatinine ratio 14 12 - 20      GFR est AA 58 (L) >60 ml/min/1.73m2    GFR est non-AA 48 (L) >60 ml/min/1.73m2    Calcium 8.7 8.5 - 10.1 MG/DL           NOTIFY YOUR PHYSICIAN FOR ANY OF THE FOLLOWING:   Fever over 101 degrees for 24 hours. Chest pain, shortness of breath, fever, chills, nausea, vomiting, diarrhea, change in mentation, falling, weakness, bleeding. Severe pain or pain not relieved by medications. Or, any other signs or symptoms that you may have questions about. DISPOSITION:  x  Home With:   OT  PT  HH  RN       Long term SNF/Inpatient Rehab    Independent/assisted living    Hospice    Other:       PATIENT CONDITION AT DISCHARGE:     Functional status    Poor     Deconditioned    x Independent      Cognition     Lucid     Forgetful     Dementia      Catheters/lines (plus indication)    Flood     PICC     PEG    x None      Code status   x  Full code     DNR      PHYSICAL EXAMINATION AT DISCHARGE:  Gen:  No distress, alert  HEENT:  Normal cephalic atraumatic, extra-occular movements are intact. Neck:  Supple, No JVD  Lungs:  Clear bilaterally, no wheeze, no rales, normal effort  Heart:  Regular Rate and Rhythm, normal S1 and S2, no edema  Abdomen:  Soft, non tender, normal bowel sounds, no guarding.   Extremities:  Well perfused, no cyanosis or edema  Neurological:  Awake and alert, CN's are intact, normal strength throughout extremities  Skin:  No rashes or moles  Mental Status:  Normal thought process, does not appear anxious      CHRONIC MEDICAL DIAGNOSES:  Problem List as of 11/8/2021 Never Reviewed          Codes Class Noted - Resolved    Hypertensive emergency ICD-10-CM: I16.1  ICD-9-CM: 401.9  11/7/2021 - Present Discussed with patient and family. Explained the importance of following up, Compliance with medications and recommendations on discharge,Side effect profile of medications were explained. Safety precautions at home and while taking pain medications also explained. All questions answered to the satisfaction of the patient/family. Discussed with consultant(s) who are agreeable to the discharge. Verbal and written instructions on discharge given. Explained about Discharge medications and side effect profile. Advised patient/family to followup with their pcp for medication refills and preauthorization of medications, Home health orders. checkups,screenign programs as appropriate for age. Thank you None for taking care of your patient, Please call with any questions. Greater than 35 minutes were spent with the patient on counseling and coordination of care    Signed:   Mary Kay Garcia MD  11/8/2021  5:31 PM    Please note that this dictation was completed with [employer], the [employer] voice recognition software. Quite often unanticipated grammatical, syntax, homophones, and other interpretive errors are inadvertently transcribed by the computer software. Please disregard these errors. Please excuse any errors that have escaped final proofreading. Thank you.

## 2021-11-11 LAB — ALDOST SERPL-MCNC: 5.7 NG/DL (ref 0–30)

## 2021-11-16 LAB — RENIN PLAS-CCNC: 1.89 NG/ML/HR (ref 0.17–5.38)

## 2021-11-16 NOTE — DISCHARGE INSTRUCTIONS
Discharge Instructions       PATIENT ID: Nadine Rodriguez  MRN: [id number]   YOB: 1980    DATE OF ADMISSION: 11/6/2021  9:53 PM    DATE OF DISCHARGE: 11/8/2021    PRIMARY CARE PROVIDER: None     ATTENDING PHYSICIAN: Annie Donnelly MD  DISCHARGING PROVIDER: Balaji Franklin MD    To contact this individual call [phone] and ask the  to page. If unavailable ask to be transferred the Adult Hospitalist Department. DISCHARGE DIAGNOSES   # Hypertensive Emergency   # Acute kidney injury Vs Chronic kidney disease   # Abnormal EKG  # Hypokalemia  # Obese     CONSULTATIONS: IP CONSULT TO HOSPITALIST  IP CONSULT TO NEPHROLOGY    PROCEDURES/SURGERIES: * No surgery found *    PENDING TEST RESULTS:   At the time of discharge the following test results are still pending: PAC, PRA level     FOLLOW UP APPOINTMENTS:   Follow-up Information     Follow up With Specialties Details Why Contact Info    Nela Ho MD Cardio Vascular Surgery, Clinical Cardiac Electrophysiology Schedule an appointment as soon as possible for a visit  [address] [place] [address]      Ronald Bray MD Nephrology Schedule an appointment as soon as possible for a visit in 2 weeks Follow up  [place] [phone]      Primary care doctor   Schedule an appointment as soon as possible for a visit in 2 weeks Follow up and medication refill             ADDITIONAL CARE RECOMMENDATIONS:   - Please measure your blood pressure at home and keep a record to show to your primary care doctor or Nephrology     DIET: Low salt diet     ACTIVITY: As tolerated     WOUND CARE: None     EQUIPMENT needed: None       Radiology      DISCHARGE MEDICATIONS:   See Medication Reconciliation Form    · It is important that you take the medication exactly as they are prescribed.    · Keep your medication in the bottles provided by the pharmacist and keep a list of the medication names, dosages, and times to be taken in your wallet. · Do not take other medications without consulting your doctor. NOTIFY YOUR PHYSICIAN FOR ANY OF THE FOLLOWING:   Fever over 101 degrees for 24 hours. Chest pain, shortness of breath, fever, chills, nausea, vomiting, diarrhea, change in mentation, falling, weakness, bleeding. Severe pain or pain not relieved by medications. Or, any other signs or symptoms that you may have questions about. DISPOSITION:   x Home With:   OT  PT  HH  RN       SNF/Inpatient Rehab/LTAC    Independent/assisted living    Hospice    Other:     CDMP Checked:   Yes ***     PROBLEM LIST Updated:  Yes ***       Signed:   Balaji Franklin MD  11/8/2021  5:28 PM      Patient Education        Acute High Blood Pressure: Care Instructions  Your Care Instructions     Acute high blood pressure is very high blood pressure. It's a serious problem. Very high blood pressure can damage your brain, heart, eyes, and kidneys. You may have been given medicines to lower your blood pressure. You may have gotten them as pills or through a needle in one of your veins. This is called an IV. And maybe you were given other medicines too. These can be needed when high blood pressure causes other problems. To keep your blood pressure at a lower level, you may need to make healthy lifestyle changes. And you will probably need to take medicines. Be sure to follow up with your doctor about your blood pressure and what you can do about it. Follow-up care is a key part of your treatment and safety. Be sure to make and go to all appointments, and call your doctor if you are having problems. It's also a good idea to know your test results and keep a list of the medicines you take. How can you care for yourself at home? · See your doctor as often as he or she recommends. This is to make sure your blood pressure is under control. · Take your blood pressure medicine exactly as prescribed.  You may take one or more types. They include diuretics, beta-blockers, ACE inhibitors, calcium channel blockers, and angiotensin II receptor blockers. Call your doctor if you think you are having a problem with your medicine. · If you take blood pressure medicine, talk to your doctor before you take decongestants or anti-inflammatory medicine, such as ibuprofen. These can raise blood pressure. · Learn how to check your blood pressure at home. Check it often. · Ask your doctor if it's okay to drink alcohol. · Talk to your doctor about lifestyle changes that can help blood pressure. These include being active and managing your weight. · Don't smoke. Smoking increases your risk for heart attack and stroke. When should you call for help? Call 911  anytime you think you may need emergency care. This may mean having symptoms that suggest that your blood pressure is causing a serious heart or blood vessel problem. Your blood pressure may be over 180/120. For example, call 911 if:    · You have symptoms of a heart attack. These may include:  ? Chest pain or pressure, or a strange feeling in the chest.  ? Sweating. ? Shortness of breath. ? Nausea or vomiting. ? Pain, pressure, or a strange feeling in the back, neck, jaw, or upper belly or in one or both shoulders or arms. ? Lightheadedness or sudden weakness. ? A fast or irregular heartbeat.     · You have symptoms of a stroke. These may include:  ? Sudden numbness, tingling, weakness, or loss of movement in your face, arm, or leg, especially on only one side of your body. ? Sudden vision changes. ? Sudden trouble speaking. ? Sudden confusion or trouble understanding simple statements. ? Sudden problems with walking or balance. ? A sudden, severe headache that is different from past headaches.     · You have severe back or belly pain. Do not wait until your blood pressure comes down on its own. Get help right away.   Call your doctor now or seek immediate care if:    · Your blood pressure is much higher than normal (such as 180/120 or higher), but you don't have symptoms.     · You think high blood pressure is causing symptoms, such as:  ? Severe headache.  ? Blurry vision. Watch closely for changes in your health, and be sure to contact your doctor if:    · Your blood pressure measures higher than your doctor recommends at least 2 times. That means the top number is higher or the bottom number is higher, or both.     · You think you may be having side effects from your blood pressure medicine. Where can you learn more? Go to http://www.gray.com/  Enter H919 in the search box to learn more about \"Acute High Blood Pressure: Care Instructions. \"  Current as of: April 29, 2021               Content Version: 13.0  © 2006-2021 [employer]. Care instructions adapted under license by [employer] (which disclaims liability or warranty for this information). If you have questions about a medical condition or this instruction, always ask your healthcare professional. [place] [address] any warranty or liability for your use of this information.

## 2021-12-13 ENCOUNTER — OFFICE VISIT (OUTPATIENT)
Dept: PRIMARY CARE CLINIC | Age: 41
End: 2021-12-13
Payer: COMMERCIAL

## 2021-12-13 VITALS
WEIGHT: 247 LBS | OXYGEN SATURATION: 98 % | DIASTOLIC BLOOD PRESSURE: 79 MMHG | HEART RATE: 57 BPM | HEIGHT: 67 IN | SYSTOLIC BLOOD PRESSURE: 134 MMHG | RESPIRATION RATE: 12 BRPM | BODY MASS INDEX: 38.77 KG/M2

## 2021-12-13 DIAGNOSIS — Z23 ENCOUNTER FOR IMMUNIZATION: ICD-10-CM

## 2021-12-13 DIAGNOSIS — Z13.6 ENCOUNTER FOR LIPID SCREENING FOR CARDIOVASCULAR DISEASE: Primary | ICD-10-CM

## 2021-12-13 DIAGNOSIS — Z13.220 ENCOUNTER FOR LIPID SCREENING FOR CARDIOVASCULAR DISEASE: Primary | ICD-10-CM

## 2021-12-13 DIAGNOSIS — I10 HYPERTENSION, UNSPECIFIED TYPE: ICD-10-CM

## 2021-12-13 DIAGNOSIS — E66.9 OBESITY (BMI 30-39.9): ICD-10-CM

## 2021-12-13 PROCEDURE — 90686 IIV4 VACC NO PRSV 0.5 ML IM: CPT

## 2021-12-13 PROCEDURE — 90471 IMMUNIZATION ADMIN: CPT

## 2021-12-13 PROCEDURE — 99204 OFFICE O/P NEW MOD 45 MIN: CPT

## 2021-12-13 RX ORDER — CARVEDILOL 25 MG/1
25 TABLET ORAL 2 TIMES DAILY
COMMUNITY
Start: 2021-12-09

## 2021-12-13 RX ORDER — AMLODIPINE BESYLATE 10 MG/1
TABLET ORAL
COMMUNITY

## 2021-12-13 RX ORDER — DOXAZOSIN 2 MG/1
2 TABLET ORAL
COMMUNITY
Start: 2021-12-09

## 2021-12-13 RX ORDER — ERGOCALCIFEROL 1.25 MG/1
CAPSULE ORAL
COMMUNITY
Start: 2021-11-19

## 2021-12-13 NOTE — PROGRESS NOTES
HPI     Chief Complaint   Patient presents with    New Patient    Hypertension        HPI:  Mason Moon is a 42 y.o. male who was evaluated today in the office for recent hospitalization. Patient was admitted to [facility] on 11/6/2021 and discharged on 11/8/2021 after being sent from an urgent care for elevated blood pressure. Patient was noted to have a systolic blood pressure at the urgent care of over 976 systolic. The patient initially presented to the urgent care center secondary to sustaining corneal injury while sawing wood without eye protection. Patient was discharged with a prescription for erythromycin drops. Prior to discharge the patients blood pressure was reevaluated and noted to have a systolic blood pressure of 231. Patient was evaluated in the emergency department and again was diagnosed with hypertension with a systolic blood pressure of 230, hyperkalemia at 3.2 and a creatinine of 1.57.  UA showed traces of blood and protein. Patient was treated with Norvasc 5 mg and upon admission was placed on a Cardene drip. During his stay he was placed on carvedilol 25 mg twice daily and amlodipine 10 mg daily with discontinuation of the Cardene drip. Patient also had a abnormal EKG which showed a right bundle branch block with T wave abnormality. A cardiology consult and nephrology consult was placed upon discharge. Today the patient is here at the office to establish a primary care provider who will follow his progress. Hypertension On amlodipine 10mg, Coreg 25mg, Cardura 2mg . Compliant with medications. Does not report any headaches, blurry vision, dizziness, chest pain, shortness of breath, or palpitations. Following a low salt diet. Exercising. Obesity:BMI > 40, severely obese per medical criteria. ? Obesity?/ Prediabetes? Diet: high carbs. Sugary drinks: occasional.Etoh: occasional.     No Known Allergies    Current Outpatient Medications   Medication Sig    amLODIPine (NORVASC) 10 mg tablet amlodipine 10 mg tablet   TAKE 1 TABLET BY MOUTH DAILY    carvediloL (COREG) 25 mg tablet Take 25 mg by mouth two (2) times a day.  doxazosin (CARDURA) 2 mg tablet Take 2 mg by mouth nightly.  ergocalciferol (ERGOCALCIFEROL) 1,250 mcg (50,000 unit) capsule TAKE 1 CAPSULE BY MOUTH EVERY WEEK FOR 6 WEEKS     No current facility-administered medications for this visit. Review of Systems   Constitutional: Negative for malaise/fatigue and weight loss. Eyes: Negative for blurred vision and double vision. Respiratory: Negative for cough and shortness of breath. Cardiovascular: Negative for chest pain, palpitations and leg swelling. Gastrointestinal: Negative for heartburn and nausea. Musculoskeletal: Negative for joint pain and myalgias. Skin: Negative for itching and rash. Neurological: Negative for dizziness, tingling, loss of consciousness, weakness and headaches. Endo/Heme/Allergies: Does not bruise/bleed easily. Psychiatric/Behavioral: Negative for depression. The patient is not nervous/anxious. Reviewed PmHx, FmHx, SocHx as well as meds and allergies, updated and dated in the chart. Last PDMP Mee Sanon as Reviewed:  Review User Review Instant Review Result   Trenton Willis 12/13/2021  9:08 PM Reviewed PDMP [1]          Objective     Visit Vitals  /79 (BP 1 Location: Left upper arm, BP Patient Position: Sitting)   Pulse (!) 57   Resp 12   Ht 5' 7\" (1.702 m)   Wt 247 lb (112 kg)   SpO2 98%   BMI 38.69 kg/m²     Physical Exam  Constitutional:       Appearance: Normal appearance. HENT:      Head: Normocephalic and atraumatic. Eyes:      Extraocular Movements: Extraocular movements intact. Conjunctiva/sclera: Conjunctivae normal.      Pupils: Pupils are equal, round, and reactive to light. Cardiovascular:      Rate and Rhythm: Normal rate and regular rhythm. Pulses: Normal pulses.    Pulmonary:      Effort: Pulmonary effort is normal.      Breath sounds: Normal breath sounds. Musculoskeletal:         General: Normal range of motion. Skin:     General: Skin is warm and dry. Neurological:      General: No focal deficit present. Mental Status: He is alert and oriented to person, place, and time. Psychiatric:         Mood and Affect: Mood normal.         Behavior: Behavior normal.             Assessment and Plan     Diagnoses and all orders for this visit:    1. Encounter for lipid screening for cardiovascular disease  -     LIPID PANEL; Future   Family history of elevated cholesterol and CAD   Monitor dietary intake of fat, red meat    2. Encounter for immunization  -     INFLUENZA VIRUS VAC QUAD,SPLIT,PRESV FREE SYRINGE IM    3. Hypertension, unspecified type  -     LIPID PANEL; Future  -     METABOLIC PANEL, COMPREHENSIVE; Future  -     CBC W/O DIFF; Future    4. Obesity (BMI 30-39.9)  -     REFERRAL TO NUTRITION   Discussed appropriate BMI. Diet modification. Increase Physical activity. Medication Side Effects and Warnings were discussed with patient. Patient Labs were reviewed and or requested. Patient Past Records were reviewed and or requested. Follow-up and Dispositions    · Return in about 3 months (around 3/13/2022) for hypertension. I have discussed the diagnosis with the patient and the intended plan as seen in the above orders. The patient has received an after-visit summary and questions were answered concerning future plans. I have discussed medication side effects and warnings with the patient as well. Ashish Barrios Spotted, [facility]  [address]

## 2021-12-13 NOTE — PROGRESS NOTES
Shellia Sandifer is a 42 y.o. male    Chief Complaint   Patient presents with    New Patient    Hypertension       Health Maintenance Due   Topic Date Due    Hepatitis C Screening  Never done    COVID-19 Vaccine (1) Never done    Lipid Screen  Never done    Flu Vaccine (1) Never done       Visit Vitals  /85 (BP 1 Location: Left upper arm, BP Patient Position: Sitting)   Pulse (!) 57   Resp 12   Ht 5' 7\" (1.702 m)   Wt 247 lb (112 kg)   SpO2 98%   BMI 38.69 kg/m²       3 most recent PHQ Screens 12/13/2021   Little interest or pleasure in doing things Not at all   Feeling down, depressed, irritable, or hopeless Not at all   Total Score PHQ 2 0       No flowsheet data found. Abuse Screening Questionnaire 12/13/2021   Do you ever feel afraid of your partner? N   Are you in a relationship with someone who physically or mentally threatens you? N   Is it safe for you to go home? Y         1. Have you been to the ER, urgent care clinic since your last visit? Hospitalized since your last visit? yes [facility] a month ago for hypertension    2. Have you seen or consulted any other health care providers outside of the [address] [place] [address] since your last visit? Include any pap smears or colon screening. yes Luis Antonio nephrology and Dr Gloria Marie cardiologist

## 2021-12-14 LAB
ALBUMIN SERPL-MCNC: 4.6 G/DL (ref 4–5)
ALBUMIN/GLOB SERPL: 1.6 {RATIO} (ref 1.2–2.2)
ALP SERPL-CCNC: 71 IU/L (ref 44–121)
ALT SERPL-CCNC: 17 IU/L (ref 0–44)
AST SERPL-CCNC: 15 IU/L (ref 0–40)
BILIRUB SERPL-MCNC: 0.4 MG/DL (ref 0–1.2)
BUN SERPL-MCNC: 19 MG/DL (ref 6–24)
BUN/CREAT SERPL: 12 (ref 9–20)
CALCIUM SERPL-MCNC: 9.2 MG/DL (ref 8.7–10.2)
CHLORIDE SERPL-SCNC: 105 MMOL/L (ref 96–106)
CHOLEST SERPL-MCNC: 199 MG/DL (ref 100–199)
CO2 SERPL-SCNC: 23 MMOL/L (ref 20–29)
CREAT SERPL-MCNC: 1.62 MG/DL (ref 0.76–1.27)
ERYTHROCYTE [DISTWIDTH] IN BLOOD BY AUTOMATED COUNT: 13.9 % (ref 11.6–15.4)
GLOBULIN SER CALC-MCNC: 2.9 G/DL (ref 1.5–4.5)
GLUCOSE SERPL-MCNC: 91 MG/DL (ref 65–99)
HCT VFR BLD AUTO: 37.7 % (ref 37.5–51)
HDLC SERPL-MCNC: 46 MG/DL
HGB BLD-MCNC: 12 G/DL (ref 13–17.7)
LDLC SERPL CALC-MCNC: 135 MG/DL (ref 0–99)
MCH RBC QN AUTO: 25.8 PG (ref 26.6–33)
MCHC RBC AUTO-ENTMCNC: 31.8 G/DL (ref 31.5–35.7)
MCV RBC AUTO: 81 FL (ref 79–97)
PLATELET # BLD AUTO: 264 X10E3/UL (ref 150–450)
POTASSIUM SERPL-SCNC: 4.2 MMOL/L (ref 3.5–5.2)
PROT SERPL-MCNC: 7.5 G/DL (ref 6–8.5)
RBC # BLD AUTO: 4.65 X10E6/UL (ref 4.14–5.8)
SODIUM SERPL-SCNC: 140 MMOL/L (ref 134–144)
TRIGL SERPL-MCNC: 97 MG/DL (ref 0–149)
VLDLC SERPL CALC-MCNC: 18 MG/DL (ref 5–40)
WBC # BLD AUTO: 4.9 X10E3/UL (ref 3.4–10.8)

## 2021-12-14 NOTE — PATIENT INSTRUCTIONS

## 2021-12-23 ENCOUNTER — NURSE TRIAGE (OUTPATIENT)
Dept: OTHER | Facility: CLINIC | Age: 41
End: 2021-12-23

## 2021-12-23 NOTE — TELEPHONE ENCOUNTER
Received call from [facility] at [facility] with Red Flag Complaint. Subjective: Caller states \"flu vaccine on 12/13/21 and started getting sick that afternoon. Caller has many Covid symptoms, see travel screen. Diarrhea for 10 days. \"     Current Symptoms: fever, chills, headache, weakness, cough, SOB, diarrhea, sore throat, fatigue. Onset: 10 days ago; gradual, worsening    Associated Symptoms: reduced activity    Pain Severity: no pain; n/a; n/a    Temperature: chills and sweats, hot to touch. N/a      What has been tried: n/a      LMP: NA Pregnant: NA    Recommended disposition: see PCP today, virtual visit. Caller requesting in person visit. Caller advised that he would not be seen in person, so he stated he will got to [facility] now. Care advice provided, patient verbalizes understanding; denies any other questions or concerns; instructed to call back for any new or worsening symptoms. Writer provided warm transfer to [facility] at [facility] for appointment scheduling    Attention Provider: Thank you for allowing me to participate in the care of your patient. The patient was connected to triage in response to information provided to the [facility]. Please do not respond through this encounter as the response is not directed to a shared pool.       Reason for Disposition   [1] HIGH RISK patient AND [2] influenza exposure within the last 7 days AND [3] ONE OR MORE respiratory symptoms: cough, sore throat, runny or stuffy nose    Protocols used: COVID-19 - DIAGNOSED OR SUSPECTED-ADULT-[place]

## 2022-03-19 PROBLEM — I16.1 HYPERTENSIVE EMERGENCY: Status: ACTIVE | Noted: 2021-11-07

## 2022-04-13 ENCOUNTER — OFFICE VISIT (OUTPATIENT)
Dept: PRIMARY CARE CLINIC | Age: 42
End: 2022-04-13
Payer: COMMERCIAL

## 2022-04-13 VITALS
BODY MASS INDEX: 38.03 KG/M2 | HEIGHT: 67 IN | TEMPERATURE: 98.3 F | SYSTOLIC BLOOD PRESSURE: 140 MMHG | RESPIRATION RATE: 16 BRPM | OXYGEN SATURATION: 98 % | DIASTOLIC BLOOD PRESSURE: 76 MMHG | HEART RATE: 57 BPM | WEIGHT: 242.3 LBS

## 2022-04-13 DIAGNOSIS — I10 PRIMARY HYPERTENSION: Primary | ICD-10-CM

## 2022-04-13 PROCEDURE — 99214 OFFICE O/P EST MOD 30 MIN: CPT

## 2022-04-13 RX ORDER — CHLORTHALIDONE 25 MG/1
25 TABLET ORAL DAILY
Qty: 60 TABLET | Refills: 0 | Status: SHIPPED | OUTPATIENT
Start: 2022-04-13 | End: 2022-06-13

## 2022-04-13 NOTE — PATIENT INSTRUCTIONS
Patient to monitor BP at home twice daily for a period of 14 days. The first BP should be taken two hours after awakening, the second BP should be taken two hours prior to going to bed. Document the readings  and share via [employer] or drop it off to the office at the end of 14 days. High Blood Pressure: Care Instructions  Overview     It's normal for blood pressure to go up and down throughout the day. But if it stays up, you have high blood pressure. Another name for high blood pressure is hypertension. Despite what a lot of people think, high blood pressure usually doesn't cause headaches or make you feel dizzy or lightheaded. It usually has no symptoms. But it does increase your risk of stroke, heart attack, and other problems. You and your doctor will talk about your risks of these problems based on your blood pressure. Your doctor will give you a goal for your blood pressure. Your goal will be based on your health and your age. Lifestyle changes, such as eating healthy and being active, are always important to help lower blood pressure. You might also take medicine to reach your blood pressure goal.  Follow-up care is a key part of your treatment and safety. Be sure to make and go to all appointments, and call your doctor if you are having problems. It's also a good idea to know your test results and keep a list of the medicines you take. How can you care for yourself at home? Medical treatment  · If you stop taking your medicine, your blood pressure will go back up. You may take one or more types of medicine to lower your blood pressure. Be safe with medicines. Take your medicine exactly as prescribed. Call your doctor if you think you are having a problem with your medicine. · Talk to your doctor before you start taking aspirin every day. Aspirin can help certain people lower their risk of a heart attack or stroke.  But taking aspirin isn't right for everyone, because it can cause serious bleeding. · See your doctor regularly. You may need to see the doctor more often at first or until your blood pressure comes down. · If you are taking blood pressure medicine, talk to your doctor before you take decongestants or anti-inflammatory medicine, such as ibuprofen. Some of these medicines can raise blood pressure. · Learn how to check your blood pressure at home. Lifestyle changes  · Stay at a healthy weight. This is especially important if you put on weight around the waist. Losing even 10 pounds can help you lower your blood pressure. · If your doctor recommends it, get more exercise. Walking is a good choice. Bit by bit, increase the amount you walk every day. Try for at least 30 minutes on most days of the week. You also may want to swim, bike, or do other activities. · Avoid or limit alcohol. Talk to your doctor about whether you can drink any alcohol. · Try to limit how much sodium you eat to less than 2,300 milligrams (mg) a day. Your doctor may ask you to try to eat less than 1,500 mg a day. · Eat plenty of fruits (such as bananas and oranges), vegetables, legumes, whole grains, and low-fat dairy products. · Lower the amount of saturated fat in your diet. Saturated fat is found in animal products such as milk, cheese, and meat. Limiting these foods may help you lose weight and also lower your risk for heart disease. · Do not smoke. Smoking increases your risk for heart attack and stroke. If you need help quitting, talk to your doctor about stop-smoking programs and medicines. These can increase your chances of quitting for good. When should you call for help? Call 911  anytime you think you may need emergency care. This may mean having symptoms that suggest that your blood pressure is causing a serious heart or blood vessel problem. Your blood pressure may be over 180/120. For example, call 911 if:    · You have symptoms of a heart attack. These may include:  ?  Chest pain or pressure, or a strange feeling in the chest.  ? Sweating. ? Shortness of breath. ? Nausea or vomiting. ? Pain, pressure, or a strange feeling in the back, neck, jaw, or upper belly or in one or both shoulders or arms. ? Lightheadedness or sudden weakness. ? A fast or irregular heartbeat.     · You have symptoms of a stroke. These may include:  ? Sudden numbness, tingling, weakness, or loss of movement in your face, arm, or leg, especially on only one side of your body. ? Sudden vision changes. ? Sudden trouble speaking. ? Sudden confusion or trouble understanding simple statements. ? Sudden problems with walking or balance. ? A sudden, severe headache that is different from past headaches.     · You have severe back or belly pain. Do not wait until your blood pressure comes down on its own. Get help right away. Call your doctor now or seek immediate care if:    · Your blood pressure is much higher than normal (such as 180/120 or higher), but you don't have symptoms.     · You think high blood pressure is causing symptoms, such as:  ? Severe headache.  ? Blurry vision. Watch closely for changes in your health, and be sure to contact your doctor if:    · Your blood pressure measures higher than your doctor recommends at least 2 times. That means the top number is higher or the bottom number is higher, or both.     · You think you may be having side effects from your blood pressure medicine. Where can you learn more? Go to http://www.gray.com/  Enter [id number] in the search box to learn more about \"High Blood Pressure: Care Instructions. \"  Current as of: January 10, 2022               Content Version: 13.2  © 0605-9739 [employer]. Care instructions adapted under license by [employer] (which disclaims liability or warranty for this information).  If you have questions about a medical condition or this instruction, always ask your healthcare professional. [employer] disclaims any warranty or liability for your use of this information.

## 2022-04-13 NOTE — PROGRESS NOTES
Chief Complaint   Patient presents with    Follow-up     blood pressure monitoring     Visit Vitals  BP (!) 140/76 (BP 1 Location: Right arm, BP Patient Position: Sitting, BP Cuff Size: Adult)   Pulse (!) 57   Temp 98.3 °F (36.8 °C) (Oral)   Resp 16   Ht 5' 7\" (1.702 m)   Wt 242 lb 4.8 oz (109.9 kg)   SpO2 98%   BMI 37.95 kg/m²     1. \"Have you been to the ER, urgent care clinic since your last visit? Hospitalized since your last visit? \" No    2. \"Have you seen or consulted any other health care providers outside of the [address] [place] [address] since your last visit? \" No     3. For patients aged 39-70: Has the patient had a colonoscopy / FIT/ Cologuard? NA - based on age      If the patient is female:    4. For patients aged 41-77: Has the patient had a mammogram within the past 2 years? NA - based on age or sex      11. For patients aged 21-65: Has the patient had a pap smear?  NA - based on age or sex

## 2022-04-13 NOTE — PROGRESS NOTES
HPI     Chief Complaint   Patient presents with    Follow-up     blood pressure monitoring        HPI:  Lisette Lassiter is a 42 y.o. male who is here secondary to longstanding htn. Hypertension: Patients hypertension is not well controlled on regimen of Amlodipine, Coreg, and cardura. Denies headaches, blurred vision or dizziness. Pateint  Was evaluated by  Cardiology on January 21, 2022 secondary to HTN. Per the patient no definitive diagnosis was made. Patient was referred to nephrology with appointment on 4/29/2022. No Known Allergies         Current Outpatient Medications   Medication Sig    chlorthalidone (HYGROTON) 25 mg tablet Take 1 Tablet by mouth daily.  amLODIPine (NORVASC) 10 mg tablet amlodipine 10 mg tablet   TAKE 1 TABLET BY MOUTH DAILY    carvediloL (COREG) 25 mg tablet Take 25 mg by mouth two (2) times a day.  doxazosin (CARDURA) 2 mg tablet Take 2 mg by mouth nightly.  ergocalciferol (ERGOCALCIFEROL) 1,250 mcg (50,000 unit) capsule TAKE 1 CAPSULE BY MOUTH EVERY WEEK FOR 6 WEEKS     No current facility-administered medications for this visit. Review of Systems   Constitutional: Negative for malaise/fatigue and weight loss. Eyes: Negative for blurred vision and double vision. Respiratory: Negative for cough and shortness of breath. Cardiovascular: Negative for chest pain, palpitations and leg swelling. Gastrointestinal: Negative for heartburn and nausea. Musculoskeletal: Negative for joint pain and myalgias. Skin: Negative for itching and rash. Neurological: Negative for dizziness, tingling, loss of consciousness, weakness and headaches. Endo/Heme/Allergies: Does not bruise/bleed easily. Psychiatric/Behavioral: Negative for depression. The patient is not nervous/anxious. All other systems reviewed and are negative. Reviewed PmHx, FmHx, SocHx as well as meds and allergies, updated and dated in the chart.          Objective     Visit Vitals  BP (!) 140/76 (BP 1 Location: Right arm, BP Patient Position: Sitting, BP Cuff Size: Adult)   Pulse (!) 57   Temp 98.3 °F (36.8 °C) (Oral)   Resp 16   Ht 5' 7\" (1.702 m)   Wt 242 lb 4.8 oz (109.9 kg)   SpO2 98%   BMI 37.95 kg/m²     Physical Exam  Vitals and nursing note reviewed. Constitutional:       General: He is not in acute distress. Appearance: Normal appearance. He is normal weight. HENT:      Head: Normocephalic and atraumatic. Neck:      Thyroid: No thyroid mass or thyromegaly. Cardiovascular:      Rate and Rhythm: Normal rate and regular rhythm. Heart sounds: No murmur heard. Pulmonary:      Effort: Pulmonary effort is normal. No respiratory distress. Breath sounds: Normal breath sounds. No wheezing. Musculoskeletal:      Cervical back: Neck supple. No muscular tenderness. Right lower leg: No edema. Left lower leg: No edema. Lymphadenopathy:      Cervical: No cervical adenopathy. Skin:     General: Skin is warm and dry. Neurological:      Mental Status: He is alert and oriented to person, place, and time. Mental status is at baseline. Psychiatric:         Attention and Perception: Attention and perception normal.         Mood and Affect: Mood and affect normal.         Speech: Speech normal.         Behavior: Behavior normal.             Assessment and Plan     Diagnoses and all orders for this visit:    1. Primary hypertension  Assessment & Plan:   uncontrolled, changes made today: [place] rx sent to pharmacy. Orders:  -     chlorthalidone (HYGROTON) 25 mg tablet; Take 1 Tablet by mouth daily. Medication Side Effects and Warnings were discussed with patient. Patient Labs were reviewed and or requested. Patient Past Records were reviewed and or requested. Follow-up and Dispositions    · Return in about 4 weeks (around 5/11/2022).         On this date 4/13/2022 I have spent 30 minutes reviewing previous notes, test results and face to face with the patient discussing the diagnosis and plan of care as well as documenting on the day of the visit. Please note that this dictation was completed with [employer], the computer voice recognition software. Quite often unanticipated grammatical, syntax, homophones, and other interpretive errors are inadvertently transcribed by the computer software. Please disregard these errors. Please excuse any errors that have escaped final proofreading. I have discussed the diagnosis with the patient and the intended plan as seen in the above orders. The patient has received an after-visit summary and questions were answered concerning future plans. I have discussed medication side effects and warnings with the patient as well. Ashish Angel, [address] [facility]  [address]

## 2022-05-16 ENCOUNTER — OFFICE VISIT (OUTPATIENT)
Dept: PRIMARY CARE CLINIC | Age: 42
End: 2022-05-16
Payer: COMMERCIAL

## 2022-05-16 VITALS
OXYGEN SATURATION: 96 % | SYSTOLIC BLOOD PRESSURE: 119 MMHG | TEMPERATURE: 97.9 F | HEIGHT: 67 IN | BODY MASS INDEX: 36.76 KG/M2 | RESPIRATION RATE: 20 BRPM | DIASTOLIC BLOOD PRESSURE: 77 MMHG | WEIGHT: 234.2 LBS | HEART RATE: 57 BPM

## 2022-05-16 DIAGNOSIS — R53.83 FATIGUE, UNSPECIFIED TYPE: Primary | ICD-10-CM

## 2022-05-16 DIAGNOSIS — I10 PRIMARY HYPERTENSION: ICD-10-CM

## 2022-05-16 PROCEDURE — 99214 OFFICE O/P EST MOD 30 MIN: CPT

## 2022-05-16 NOTE — PROGRESS NOTES
HPI     Chief Complaint   Patient presents with    Follow Up Chronic Condition    Hypertension        HPI:  Yamilet Cardozo is a 36 y.o. male who is  Here to follow up from a previous HTN visit. Had appointment with nephrologist on April 29 who was happy with his BP readings. Hypertension: Patients hypertension is well controlled on regimen of amlodipine, chlorthalidone and Cardura. Denies headaches, blurred vision or dizziness. Blood pressure tracking for 14-day. Sent to the office. Blood pressure 119/77 patient is asymptomatic. Fatigue: History and physical exam do not suggest any organic illness associated with fatigue. No side effects of medications or substance misuse. No psychiatric issues. Good sleep quality/quantity. No Known Allergies    Current Outpatient Medications   Medication Sig    chlorthalidone (HYGROTON) 25 mg tablet Take 1 Tablet by mouth daily.  amLODIPine (NORVASC) 10 mg tablet amlodipine 10 mg tablet   TAKE 1 TABLET BY MOUTH DAILY    carvediloL (COREG) 25 mg tablet Take 25 mg by mouth two (2) times a day.  doxazosin (CARDURA) 2 mg tablet Take 2 mg by mouth nightly.  ergocalciferol (ERGOCALCIFEROL) 1,250 mcg (50,000 unit) capsule TAKE 1 CAPSULE BY MOUTH EVERY WEEK FOR 6 WEEKS     No current facility-administered medications for this visit. Review of Systems   Constitutional: Negative for malaise/fatigue and weight loss. Eyes: Negative for blurred vision and double vision. Respiratory: Negative for cough and shortness of breath. Cardiovascular: Negative for chest pain, palpitations and leg swelling. Gastrointestinal: Negative for heartburn and nausea. Musculoskeletal: Negative for joint pain and myalgias. Skin: Negative for itching and rash. Neurological: Negative for dizziness, tingling, loss of consciousness, weakness and headaches. Endo/Heme/Allergies: Does not bruise/bleed easily. Psychiatric/Behavioral: Negative for depression.  The patient is not nervous/anxious. All other systems reviewed and are negative. Reviewed PmHx, FmHx, SocHx as well as meds and allergies, updated and dated in the chart. Objective     Visit Vitals  /77 (BP 1 Location: Left arm)   Pulse (!) 57   Temp 97.9 °F (36.6 °C) (Temporal)   Resp 20   Ht 5' 7\" (1.702 m)   Wt 234 lb 3.2 oz (106.2 kg)   SpO2 96%   BMI 36.68 kg/m²     Physical Exam  Constitutional:       General: He is not in acute distress. Appearance: Normal appearance. He is normal weight. HENT:      Head: Normocephalic and atraumatic. Neck:      Thyroid: No thyroid mass or thyromegaly. Cardiovascular:      Rate and Rhythm: Normal rate and regular rhythm. Heart sounds: No murmur heard. Pulmonary:      Effort: Pulmonary effort is normal. No respiratory distress. Breath sounds: Normal breath sounds. No wheezing. Musculoskeletal:      Cervical back: Neck supple. No muscular tenderness. Right lower leg: No edema. Left lower leg: No edema. Lymphadenopathy:      Cervical: No cervical adenopathy. Skin:     General: Skin is warm and dry. Neurological:      Mental Status: He is alert and oriented to person, place, and time. Mental status is at baseline. Psychiatric:         Attention and Perception: Attention and perception normal.         Mood and Affect: Mood and affect normal.         Speech: Speech normal.         Behavior: Behavior normal.             Assessment and Plan     Diagnoses and all orders for this visit:    1. Fatigue, unspecified type  Assessment & Plan:   unclear control, continue current plan pending work up below    Orders:  -     METABOLIC PANEL, COMPREHENSIVE  -     TSH RFX ON ABNORMAL TO FREE T4    2. Primary hypertension  Assessment & Plan:   well controlled, continue current medications, medication adherence emphasized         Medication Side Effects and Warnings were discussed with patient.   Patient Labs were reviewed and or requested. Patient Past Records were reviewed and or requested. Follow-up and Dispositions    · Return in about 2 months (around 7/16/2022). On this date 5/16/2022 I have spent 30  minutes reviewing previous notes, test results and face to face with the patient discussing the diagnosis and plan of care as well as documenting on the day of the visit. Please note that this dictation was completed with [email or web address], the computer voice recognition software. Quite often unanticipated grammatical, syntax, homophones, and other interpretive errors are inadvertently transcribed by the computer software. Please disregard these errors. Please excuse any errors that have escaped final proofreading. I have discussed the diagnosis with the patient and the intended plan as seen in the above orders. The patient has received an after-visit summary and questions were answered concerning future plans. I have discussed medication side effects and warnings with the patient as well. Ashish Aleman, [address] [facility]  [address]

## 2022-05-16 NOTE — PATIENT INSTRUCTIONS
Patient to monitor BP at home twice daily for a period of 14 days. The first BP should be taken two hours after awakening, the second BP should be taken two hours prior to going to bed. Document the readings  and share via [employer] or drop it off to the office at the end of 14 days. Fatigue: Care Instructions  Your Care Instructions     Fatigue is a feeling of tiredness, exhaustion, or lack of energy. You may feel fatigue because of too much or not enough activity. It can also come from stress, lack of sleep, boredom, and poor diet. Many medical problems, such as viral infections, can cause fatigue. Emotional problems, especially depression, are often the cause of fatigue. Fatigue is most often a symptom of another problem. Treatment for fatigue depends on the cause. For example, if you have fatigue because you have a certain health problem, treating this problem also treats your fatigue. If depression or anxiety is the cause, treatment may help. Follow-up care is a key part of your treatment and safety. Be sure to make and go to all appointments, and call your doctor if you are having problems. It's also a good idea to know your test results and keep a list of the medicines you take. How can you care for yourself at home? · Get regular exercise. But don't overdo it. Go back and forth between rest and exercise. · Get plenty of rest.  · Eat a healthy diet. Do not skip meals, especially breakfast.  · Reduce your use of caffeine, tobacco, and alcohol. Caffeine is most often found in coffee, tea, cola drinks, and chocolate. · Limit medicines that can cause fatigue. This includes tranquilizers and cold and allergy medicines. When should you call for help? Watch closely for changes in your health, and be sure to contact your doctor if:    · You have new symptoms such as fever or a rash.     · Your fatigue gets worse.     · You have been feeling down, depressed, or hopeless.  Or you may have lost interest in things that you usually enjoy.     · You are not getting better as expected. Where can you learn more? Go to http://www.gray.com/  Enter [id number] in the search box to learn more about \"Fatigue: Care Instructions. \"  Current as of: July 1, 2021               Content Version: 13.2  © 2006-2022 [employer]. Care instructions adapted under license by [employer] (which disclaims liability or warranty for this information). If you have questions about a medical condition or this instruction, always ask your healthcare professional. [address] any warranty or liability for your use of this information.

## 2022-05-16 NOTE — PROGRESS NOTES
1. \"Have you been to the ER, urgent care clinic since your last visit? Hospitalized since your last visit? \" No    2. \"Have you seen or consulted any other health care providers outside of the [address] [place] [address] since your last visit? \" No     3. For patients aged 39-70: Has the patient had a colonoscopy / FIT/ Cologuard? No      If the patient is female:    4. For patients aged 41-77: Has the patient had a mammogram within the past 2 years? NA - based on age or sex      11. For patients aged 21-65: Has the patient had a pap smear?  NA - based on age or sex     Visit Vitals  /77 (BP 1 Location: Left arm)   Pulse (!) 57   Temp 97.9 °F (36.6 °C) (Temporal)   Resp 20   Ht 5' 7\" (1.702 m)   Wt 234 lb 3.2 oz (106.2 kg)   SpO2 96%   BMI 36.68 kg/m²       Chief Complaint   Patient presents with    Follow Up Chronic Condition    Hypertension

## 2022-05-17 LAB
ALBUMIN SERPL-MCNC: 4.5 G/DL (ref 4–5)
ALBUMIN/GLOB SERPL: 1.7 {RATIO} (ref 1.2–2.2)
ALP SERPL-CCNC: 64 IU/L (ref 44–121)
ALT SERPL-CCNC: 12 IU/L (ref 0–44)
AST SERPL-CCNC: 14 IU/L (ref 0–40)
BILIRUB SERPL-MCNC: 0.3 MG/DL (ref 0–1.2)
BUN SERPL-MCNC: 23 MG/DL (ref 6–24)
BUN/CREAT SERPL: 14 (ref 9–20)
CALCIUM SERPL-MCNC: 9.5 MG/DL (ref 8.7–10.2)
CHLORIDE SERPL-SCNC: 101 MMOL/L (ref 96–106)
CO2 SERPL-SCNC: 23 MMOL/L (ref 20–29)
CREAT SERPL-MCNC: 1.61 MG/DL (ref 0.76–1.27)
EGFR: 54 ML/MIN/1.73
GLOBULIN SER CALC-MCNC: 2.7 G/DL (ref 1.5–4.5)
GLUCOSE SERPL-MCNC: 91 MG/DL (ref 65–99)
POTASSIUM SERPL-SCNC: 3.3 MMOL/L (ref 3.5–5.2)
PROT SERPL-MCNC: 7.2 G/DL (ref 6–8.5)
SODIUM SERPL-SCNC: 141 MMOL/L (ref 134–144)
TSH SERPL DL<=0.005 MIU/L-ACNC: 1.34 UIU/ML (ref 0.45–4.5)

## 2022-06-09 DIAGNOSIS — I10 PRIMARY HYPERTENSION: ICD-10-CM

## 2022-06-13 RX ORDER — CHLORTHALIDONE 25 MG/1
25 TABLET ORAL DAILY
Qty: 60 TABLET | Refills: 0 | Status: SHIPPED | OUTPATIENT
Start: 2022-06-13

## 2024-10-20 ENCOUNTER — APPOINTMENT (OUTPATIENT)
Facility: HOSPITAL | Age: 44
DRG: 064 | End: 2024-10-20
Attending: INTERNAL MEDICINE
Payer: COMMERCIAL

## 2024-10-20 ENCOUNTER — APPOINTMENT (OUTPATIENT)
Facility: HOSPITAL | Age: 44
DRG: 304 | End: 2024-10-20
Payer: COMMERCIAL

## 2024-10-20 ENCOUNTER — HOSPITAL ENCOUNTER (INPATIENT)
Facility: HOSPITAL | Age: 44
LOS: 1 days | Discharge: ANOTHER HOSPITAL | DRG: 304 | End: 2024-10-20
Attending: EMERGENCY MEDICINE | Admitting: FAMILY MEDICINE
Payer: COMMERCIAL

## 2024-10-20 ENCOUNTER — HOSPITAL ENCOUNTER (INPATIENT)
Facility: HOSPITAL | Age: 44
LOS: 10 days | Discharge: NURSING, REHAB OR LONG-TERM CARE | DRG: 064 | End: 2024-10-30
Attending: INTERNAL MEDICINE | Admitting: INTERNAL MEDICINE
Payer: COMMERCIAL

## 2024-10-20 VITALS
DIASTOLIC BLOOD PRESSURE: 108 MMHG | HEIGHT: 67 IN | HEART RATE: 92 BPM | BODY MASS INDEX: 40.81 KG/M2 | WEIGHT: 260 LBS | OXYGEN SATURATION: 95 % | SYSTOLIC BLOOD PRESSURE: 196 MMHG | RESPIRATION RATE: 18 BRPM | TEMPERATURE: 97.6 F

## 2024-10-20 DIAGNOSIS — I61.3 PONTINE HEMORRHAGE (HCC): ICD-10-CM

## 2024-10-20 DIAGNOSIS — R42 DIZZINESS: ICD-10-CM

## 2024-10-20 DIAGNOSIS — E87.6 HYPOKALEMIA: ICD-10-CM

## 2024-10-20 DIAGNOSIS — I16.1 HYPERTENSIVE EMERGENCY: Primary | ICD-10-CM

## 2024-10-20 DIAGNOSIS — I61.3 PONTINE HEMORRHAGE (HCC): Primary | ICD-10-CM

## 2024-10-20 DIAGNOSIS — N17.9 AKI (ACUTE KIDNEY INJURY) (HCC): ICD-10-CM

## 2024-10-20 PROBLEM — I10 UNCONTROLLED HYPERTENSION: Status: ACTIVE | Noted: 2024-10-20

## 2024-10-20 LAB
ALBUMIN SERPL-MCNC: 2.9 G/DL (ref 3.5–5.2)
ALBUMIN/GLOB SERPL: 0.9 (ref 1.1–2.2)
ALP SERPL-CCNC: 77 U/L (ref 40–129)
ALT SERPL-CCNC: 18 U/L (ref 10–50)
AMPHET UR QL SCN: NEGATIVE
ANION GAP SERPL CALC-SCNC: 14 MMOL/L (ref 2–12)
ANION GAP SERPL CALC-SCNC: 4 MMOL/L (ref 2–12)
APTT PPP: 27.7 SEC (ref 21.2–34.1)
AST SERPL-CCNC: 17 U/L (ref 10–50)
BARBITURATES UR QL SCN: NEGATIVE
BASOPHILS # BLD: 0 K/UL (ref 0–1)
BASOPHILS NFR BLD: 0 % (ref 0–1)
BENZODIAZ UR QL: NEGATIVE
BILIRUB SERPL-MCNC: 0.2 MG/DL (ref 0.2–1)
BUN SERPL-MCNC: 30 MG/DL (ref 6–20)
BUN SERPL-MCNC: 33 MG/DL (ref 6–20)
BUN/CREAT SERPL: 11 (ref 12–20)
BUN/CREAT SERPL: 11 (ref 12–20)
CALCIUM SERPL-MCNC: 8.3 MG/DL (ref 8.5–10.1)
CALCIUM SERPL-MCNC: 8.5 MG/DL (ref 8.6–10)
CANNABINOIDS UR QL SCN: NEGATIVE
CHLORIDE SERPL-SCNC: 106 MMOL/L (ref 98–107)
CHLORIDE SERPL-SCNC: 113 MMOL/L (ref 97–108)
CO2 SERPL-SCNC: 23 MMOL/L (ref 22–29)
CO2 SERPL-SCNC: 25 MMOL/L (ref 21–32)
COCAINE UR QL SCN: NEGATIVE
COMMENT:: NORMAL
CREAT SERPL-MCNC: 2.84 MG/DL (ref 0.7–1.3)
CREAT SERPL-MCNC: 3.12 MG/DL (ref 0.7–1.2)
DIFFERENTIAL METHOD BLD: ABNORMAL
EOSINOPHIL # BLD: 0.3 K/UL (ref 0–0.4)
EOSINOPHIL NFR BLD: 3 % (ref 0–7)
ERYTHROCYTE [DISTWIDTH] IN BLOOD BY AUTOMATED COUNT: 13.7 % (ref 11.5–14.5)
GLOBULIN SER CALC-MCNC: 3.1 G/DL (ref 2–4)
GLUCOSE SERPL-MCNC: 131 MG/DL (ref 65–100)
GLUCOSE SERPL-MCNC: 181 MG/DL (ref 65–100)
HCT VFR BLD AUTO: 40.6 % (ref 36.6–50.3)
HGB BLD-MCNC: 13 G/DL (ref 12.1–17)
IMM GRANULOCYTES # BLD AUTO: 0.1 K/UL (ref 0–0.04)
IMM GRANULOCYTES NFR BLD AUTO: 1 % (ref 0–0.5)
INR PPP: 1 (ref 0.9–1.1)
LIPASE SERPL-CCNC: 72 U/L (ref 13–60)
LYMPHOCYTES # BLD: 4.7 K/UL (ref 0.8–3.5)
LYMPHOCYTES NFR BLD: 44 % (ref 12–49)
Lab: NORMAL
MCH RBC QN AUTO: 26 PG (ref 26–34)
MCHC RBC AUTO-ENTMCNC: 32 G/DL (ref 30–36.5)
MCV RBC AUTO: 81.2 FL (ref 80–99)
METHADONE UR QL: NEGATIVE
MONOCYTES # BLD: 0.8 K/UL (ref 0–1)
MONOCYTES NFR BLD: 8 % (ref 5–13)
NEUTS SEG # BLD: 4.7 K/UL (ref 1.8–8)
NEUTS SEG NFR BLD: 44 % (ref 32–75)
NRBC # BLD: 0 K/UL (ref 0–0.01)
NRBC BLD-RTO: 0 PER 100 WBC
OPIATES UR QL: NEGATIVE
PCP UR QL: NEGATIVE
PLATELET # BLD AUTO: 357 K/UL (ref 150–400)
PMV BLD AUTO: 10.5 FL (ref 8.9–12.9)
POTASSIUM SERPL-SCNC: 3 MMOL/L (ref 3.5–5.1)
POTASSIUM SERPL-SCNC: 3.3 MMOL/L (ref 3.5–5.1)
PROT SERPL-MCNC: 6 G/DL (ref 6.4–8.3)
PROTHROMBIN TIME: 13.2 SEC (ref 11.9–14.6)
RBC # BLD AUTO: 5 M/UL (ref 4.1–5.7)
SODIUM SERPL-SCNC: 142 MMOL/L (ref 136–145)
SODIUM SERPL-SCNC: 143 MMOL/L (ref 136–145)
SPECIMEN HOLD: NORMAL
THERAPEUTIC RANGE: NORMAL SECS (ref 82–109)
TROPONIN T SERPL HS-MCNC: 19 NG/L (ref 0–22)
TROPONIN T SERPL HS-MCNC: 19.1 NG/L (ref 0–22)
WBC # BLD AUTO: 10.6 K/UL (ref 4.1–11.1)

## 2024-10-20 PROCEDURE — 70498 CT ANGIOGRAPHY NECK: CPT

## 2024-10-20 PROCEDURE — 0042T CT BRAIN PERFUSION: CPT

## 2024-10-20 PROCEDURE — 2580000003 HC RX 258

## 2024-10-20 PROCEDURE — 85730 THROMBOPLASTIN TIME PARTIAL: CPT

## 2024-10-20 PROCEDURE — 6360000004 HC RX CONTRAST MEDICATION

## 2024-10-20 PROCEDURE — 36415 COLL VENOUS BLD VENIPUNCTURE: CPT

## 2024-10-20 PROCEDURE — 84484 ASSAY OF TROPONIN QUANT: CPT

## 2024-10-20 PROCEDURE — 6360000002 HC RX W HCPCS: Performed by: NURSE PRACTITIONER

## 2024-10-20 PROCEDURE — 70450 CT HEAD/BRAIN W/O DYE: CPT

## 2024-10-20 PROCEDURE — 96374 THER/PROPH/DIAG INJ IV PUSH: CPT

## 2024-10-20 PROCEDURE — 2580000003 HC RX 258: Performed by: NURSE PRACTITIONER

## 2024-10-20 PROCEDURE — 1100000000 HC RM PRIVATE

## 2024-10-20 PROCEDURE — 80048 BASIC METABOLIC PNL TOTAL CA: CPT

## 2024-10-20 PROCEDURE — 6370000000 HC RX 637 (ALT 250 FOR IP): Performed by: NURSE PRACTITIONER

## 2024-10-20 PROCEDURE — 85025 COMPLETE CBC W/AUTO DIFF WBC: CPT

## 2024-10-20 PROCEDURE — 70551 MRI BRAIN STEM W/O DYE: CPT

## 2024-10-20 PROCEDURE — 6360000002 HC RX W HCPCS: Performed by: EMERGENCY MEDICINE

## 2024-10-20 PROCEDURE — 85610 PROTHROMBIN TIME: CPT

## 2024-10-20 PROCEDURE — 2580000003 HC RX 258: Performed by: EMERGENCY MEDICINE

## 2024-10-20 PROCEDURE — 83690 ASSAY OF LIPASE: CPT

## 2024-10-20 PROCEDURE — 99285 EMERGENCY DEPT VISIT HI MDM: CPT

## 2024-10-20 PROCEDURE — 80053 COMPREHEN METABOLIC PANEL: CPT

## 2024-10-20 PROCEDURE — 93005 ELECTROCARDIOGRAM TRACING: CPT | Performed by: EMERGENCY MEDICINE

## 2024-10-20 PROCEDURE — 80307 DRUG TEST PRSMV CHEM ANLYZR: CPT

## 2024-10-20 PROCEDURE — 2000000000 HC ICU R&B

## 2024-10-20 PROCEDURE — APPNB30 APP NON BILLABLE TIME 0-30 MINS: Performed by: NURSE PRACTITIONER

## 2024-10-20 PROCEDURE — 6370000000 HC RX 637 (ALT 250 FOR IP)

## 2024-10-20 PROCEDURE — 6360000002 HC RX W HCPCS

## 2024-10-20 PROCEDURE — 99223 1ST HOSP IP/OBS HIGH 75: CPT | Performed by: PSYCHIATRY & NEUROLOGY

## 2024-10-20 RX ORDER — CARVEDILOL 6.25 MG/1
25 TABLET ORAL
Status: COMPLETED | OUTPATIENT
Start: 2024-10-20 | End: 2024-10-20

## 2024-10-20 RX ORDER — LABETALOL HYDROCHLORIDE 5 MG/ML
10 INJECTION, SOLUTION INTRAVENOUS EVERY 10 MIN PRN
Status: DISCONTINUED | OUTPATIENT
Start: 2024-10-20 | End: 2024-10-21

## 2024-10-20 RX ORDER — LANOLIN ALCOHOL/MO/W.PET/CERES
400 CREAM (GRAM) TOPICAL ONCE
Status: DISCONTINUED | OUTPATIENT
Start: 2024-10-20 | End: 2024-10-20

## 2024-10-20 RX ORDER — LABETALOL HYDROCHLORIDE 5 MG/ML
20 INJECTION, SOLUTION INTRAVENOUS ONCE
Status: COMPLETED | OUTPATIENT
Start: 2024-10-20 | End: 2024-10-20

## 2024-10-20 RX ORDER — ACETAMINOPHEN 325 MG/1
650 TABLET ORAL EVERY 6 HOURS PRN
Status: DISCONTINUED | OUTPATIENT
Start: 2024-10-20 | End: 2024-10-30 | Stop reason: HOSPADM

## 2024-10-20 RX ORDER — ONDANSETRON 2 MG/ML
4 INJECTION INTRAMUSCULAR; INTRAVENOUS
Status: COMPLETED | OUTPATIENT
Start: 2024-10-20 | End: 2024-10-20

## 2024-10-20 RX ORDER — ONDANSETRON 2 MG/ML
4 INJECTION INTRAMUSCULAR; INTRAVENOUS EVERY 6 HOURS PRN
Status: DISCONTINUED | OUTPATIENT
Start: 2024-10-20 | End: 2024-10-30 | Stop reason: HOSPADM

## 2024-10-20 RX ORDER — LABETALOL HYDROCHLORIDE 5 MG/ML
10 INJECTION, SOLUTION INTRAVENOUS EVERY 10 MIN PRN
Status: DISCONTINUED | OUTPATIENT
Start: 2024-10-20 | End: 2024-10-20

## 2024-10-20 RX ORDER — IOPAMIDOL 755 MG/ML
100 INJECTION, SOLUTION INTRAVASCULAR ONCE
Status: COMPLETED | OUTPATIENT
Start: 2024-10-20 | End: 2024-10-20

## 2024-10-20 RX ORDER — HYDRALAZINE HYDROCHLORIDE 20 MG/ML
10 INJECTION INTRAMUSCULAR; INTRAVENOUS EVERY 4 HOURS PRN
Status: DISCONTINUED | OUTPATIENT
Start: 2024-10-20 | End: 2024-10-23

## 2024-10-20 RX ORDER — ENOXAPARIN SODIUM 100 MG/ML
40 INJECTION SUBCUTANEOUS DAILY
Status: DISCONTINUED | OUTPATIENT
Start: 2024-10-20 | End: 2024-10-25

## 2024-10-20 RX ORDER — ATORVASTATIN CALCIUM 40 MG/1
80 TABLET, FILM COATED ORAL NIGHTLY
Status: DISCONTINUED | OUTPATIENT
Start: 2024-10-20 | End: 2024-10-30 | Stop reason: HOSPADM

## 2024-10-20 RX ORDER — LABETALOL HYDROCHLORIDE 5 MG/ML
INJECTION, SOLUTION INTRAVENOUS
Status: DISPENSED
Start: 2024-10-20 | End: 2024-10-21

## 2024-10-20 RX ORDER — POTASSIUM CHLORIDE 750 MG/1
40 TABLET, EXTENDED RELEASE ORAL ONCE
Status: COMPLETED | OUTPATIENT
Start: 2024-10-20 | End: 2024-10-20

## 2024-10-20 RX ORDER — 0.9 % SODIUM CHLORIDE 0.9 %
250 INTRAVENOUS SOLUTION INTRAVENOUS ONCE
Status: DISCONTINUED | OUTPATIENT
Start: 2024-10-20 | End: 2024-10-20

## 2024-10-20 RX ORDER — ONDANSETRON 4 MG/1
4 TABLET, ORALLY DISINTEGRATING ORAL EVERY 8 HOURS PRN
Status: DISCONTINUED | OUTPATIENT
Start: 2024-10-20 | End: 2024-10-30 | Stop reason: HOSPADM

## 2024-10-20 RX ORDER — AMLODIPINE BESYLATE 5 MG/1
10 TABLET ORAL
Status: COMPLETED | OUTPATIENT
Start: 2024-10-20 | End: 2024-10-20

## 2024-10-20 RX ORDER — SODIUM CHLORIDE 0.9 % (FLUSH) 0.9 %
5-40 SYRINGE (ML) INJECTION EVERY 12 HOURS SCHEDULED
Status: DISCONTINUED | OUTPATIENT
Start: 2024-10-20 | End: 2024-10-30 | Stop reason: HOSPADM

## 2024-10-20 RX ORDER — 0.9 % SODIUM CHLORIDE 0.9 %
1000 INTRAVENOUS SOLUTION INTRAVENOUS ONCE
Status: COMPLETED | OUTPATIENT
Start: 2024-10-20 | End: 2024-10-20

## 2024-10-20 RX ORDER — SODIUM CHLORIDE 0.9 % (FLUSH) 0.9 %
5-40 SYRINGE (ML) INJECTION PRN
Status: DISCONTINUED | OUTPATIENT
Start: 2024-10-20 | End: 2024-10-30 | Stop reason: HOSPADM

## 2024-10-20 RX ORDER — MECLIZINE HYDROCHLORIDE 25 MG/1
25 TABLET ORAL
Status: COMPLETED | OUTPATIENT
Start: 2024-10-20 | End: 2024-10-20

## 2024-10-20 RX ORDER — POLYETHYLENE GLYCOL 3350 17 G/17G
17 POWDER, FOR SOLUTION ORAL DAILY PRN
Status: DISCONTINUED | OUTPATIENT
Start: 2024-10-20 | End: 2024-10-30 | Stop reason: HOSPADM

## 2024-10-20 RX ORDER — POTASSIUM CHLORIDE 7.45 MG/ML
10 INJECTION INTRAVENOUS ONCE
Status: COMPLETED | OUTPATIENT
Start: 2024-10-20 | End: 2024-10-20

## 2024-10-20 RX ADMIN — LABETALOL HYDROCHLORIDE 20 MG: 5 INJECTION INTRAVENOUS at 13:15

## 2024-10-20 RX ADMIN — NICARDIPINE HYDROCHLORIDE 7.5 MG/HR: 2.5 INJECTION, SOLUTION INTRAVENOUS at 11:43

## 2024-10-20 RX ADMIN — ONDANSETRON 4 MG: 2 INJECTION INTRAMUSCULAR; INTRAVENOUS at 09:52

## 2024-10-20 RX ADMIN — NICARDIPINE HYDROCHLORIDE 5 MG/HR: 2.5 INJECTION, SOLUTION INTRAVENOUS at 11:24

## 2024-10-20 RX ADMIN — SODIUM CHLORIDE 1000 ML: 9 INJECTION, SOLUTION INTRAVENOUS at 10:06

## 2024-10-20 RX ADMIN — LABETALOL HYDROCHLORIDE 1 MG/MIN: 5 INJECTION INTRAVENOUS at 14:27

## 2024-10-20 RX ADMIN — IOPAMIDOL 100 ML: 755 INJECTION, SOLUTION INTRAVENOUS at 10:52

## 2024-10-20 RX ADMIN — POTASSIUM CHLORIDE 40 MEQ: 750 TABLET, EXTENDED RELEASE ORAL at 16:57

## 2024-10-20 RX ADMIN — NICARDIPINE HYDROCHLORIDE 5 MG/HR: 0.1 INJECTION, SOLUTION INTRAVENOUS at 20:56

## 2024-10-20 RX ADMIN — ACETAMINOPHEN 650 MG: 325 TABLET ORAL at 14:32

## 2024-10-20 RX ADMIN — CARVEDILOL 25 MG: 6.25 TABLET, FILM COATED ORAL at 09:53

## 2024-10-20 RX ADMIN — POTASSIUM CHLORIDE 10 MEQ: 7.46 INJECTION, SOLUTION INTRAVENOUS at 11:40

## 2024-10-20 RX ADMIN — MECLIZINE HYDROCHLORIDE 25 MG: 25 TABLET ORAL at 09:53

## 2024-10-20 RX ADMIN — ATORVASTATIN CALCIUM 80 MG: 40 TABLET, FILM COATED ORAL at 20:42

## 2024-10-20 RX ADMIN — AMLODIPINE BESYLATE 10 MG: 5 TABLET ORAL at 09:54

## 2024-10-20 RX ADMIN — SODIUM CHLORIDE, PRESERVATIVE FREE 10 ML: 5 INJECTION INTRAVENOUS at 20:42

## 2024-10-20 RX ADMIN — NICARDIPINE HYDROCHLORIDE 15 MG/HR: 0.1 INJECTION, SOLUTION INTRAVENOUS at 14:11

## 2024-10-20 ASSESSMENT — PAIN SCALES - GENERAL
PAINLEVEL_OUTOF10: 5
PAINLEVEL_OUTOF10: 1
PAINLEVEL_OUTOF10: 0

## 2024-10-20 ASSESSMENT — PAIN DESCRIPTION - ORIENTATION: ORIENTATION: POSTERIOR

## 2024-10-20 ASSESSMENT — PAIN DESCRIPTION - LOCATION
LOCATION: GENERALIZED
LOCATION: HEAD

## 2024-10-20 ASSESSMENT — PAIN - FUNCTIONAL ASSESSMENT: PAIN_FUNCTIONAL_ASSESSMENT: 0-10

## 2024-10-20 NOTE — ED PROVIDER NOTES
[facility] [place] EMERGENCY DEPT  EMERGENCY DEPARTMENT ENCOUNTER      Pt Name: Stevon Callands  MRN: [id number]  Birthdate 1980  Date of evaluation: 10/20/2024  Provider: Alfredo Soares PA-C    CHIEF COMPLAINT       Chief Complaint   Patient presents with    Hypertension    Dizziness    Nausea         HISTORY OF PRESENT ILLNESS   (Location/Symptom, Timing/Onset, Context/Setting, Quality, Duration, Modifying Factors, Severity)  Note limiting factors.   44-year-old male with a past medical history of hypertension presents with complaint of hypertension, dizziness, and nausea.  Patient reports he has not taken his blood pressure medication in 6 months.  He states that he was going through divorce in the medications got left at his ex-wife's house.  He was unable to get a hold of them.  Has not gotten a refill since.  This morning, upon waking up around 7:45 AM, patient started having dizziness.  He described the dizziness as a lightheaded/room spinning sensation.  He reports feeling nauseous when the dizziness onsets, especially when changing positions or turning his head very quickly.  He states he is not nauseous when laying still.  He denies any pain at this time.  Denies headache, visual disturbances, numbness, chest pain, shortness of breath, abdominal pain, changes in bowel or bladder.            Review of External Medical Records:     Nursing Notes were reviewed.    REVIEW OF SYSTEMS    (2-9 systems for level 4, 10 or more for level 5)     Review of Systems    Except as noted above the remainder of the review of systems was reviewed and negative.       PAST MEDICAL HISTORY     Past Medical History:   Diagnosis Date    Hypertension          SURGICAL HISTORY     No past surgical history on file.      CURRENT MEDICATIONS       Previous Medications    AMLODIPINE (NORVASC) 10 MG TABLET    amlodipine 10 mg tablet   TAKE 1 TABLET BY MOUTH DAILY    CARVEDILOL (COREG) 25 MG TABLET    Take 1 tablet by mouth 2 times daily    
Albumin/Globulin Ratio 10/20/2024 0.9 (L)  1.1 - 2.2   Final    Lipase 10/20/2024 72 (H)  13 - 60 U/L Final    Troponin T 10/20/2024 19.0  0 - 22 ng/L Final    Comment:    Patients should be considered for the possibility of known CKD (GFR <30), CHF with LVH or known CAD with LVH. A repeat troponin for patients with these conditions should be demonstrated by a stable follow up troponin level with a delta of <8. In some instances this could still be suggestive ofa recent MI depending on level of suspicion.  If suspicion is high, consider downstream stress or CTCA testing to further assess patient condition, else consider other etiology.     The high-sensitivity troponin T result should not be compared with other troponin methodologies. Incremental time windows are acceptable up to plus or minus 30 minutes from specified draw time.     Biotin Notice: If a patient has taken greater or equalto 10 mg of Biotin in the last 6 hours may present falsely lower.      INR 10/20/2024 1.0  0.9 - 1.1   Final    A single therapeutic range for Vit K antagonists may not be optimal for all indications - see June, 2008 issue of Chest, American College of Chest Physicians Evidence-Based Clinical Practice Guidelines, 8th Edition.    Protime 10/20/2024 13.2  11.9 - 14.6 sec Final    APTT 10/20/2024 27.7  21.2 - 34.1 sec Final    In addition to factor deficiency, monitoring heparin therapy, etc., evaluation of a prolonged aPTT result should include consideration of preanalytic variables such as heparin flush contamination, specimen integrity issues, etc.    Therapeutic Range 10/20/2024    82.0 - 109.0 SECS Final       CTA HEAD NECK W CONTRAST   Final Result   CTA Head:   1. Stable acute intraparenchymal hemorrhage in the left michell without significant   mass effect. No evidence of aneurysm or vascular malformation.   2. No evidence of significant stenosis.      CTA Neck:   1. No evidence of significant stenosis.      CT Brain Perfusion:

## 2024-10-20 NOTE — ED TRIAGE NOTES
Pt BIBA to ED.   Pt reports dizziness and nausea.  Pt reports HTN and has not taken BP meds in 6 months.  Pt is prescribed labatelol and amilodipine.  Pt states starting to feel dizzy in the morning.  Pt is noted to be diaphoretic.

## 2024-10-20 NOTE — ED NOTES
Stroke Education provided to patient and the following topics were discussed    1. Patients personal risk factors for stroke are hypertension    2. Warning signs of Stroke:        * Sudden numbness or weakness of the face, arm or leg, especially on one side of          The body            * Sudden confusion, trouble speaking or understanding        * Sudden trouble seeing in one or both eyes        * Sudden trouble walking, dizziness, loss of balance or coordination        * Sudden severe headache with no known cause      3. Importance of activation Emergency Medical Services ( 9-1-1 ) immediately if experience any warning signs of stroke.    4. Be sure and schedule a follow-up appointment with your primary care doctor or any specialists as instructed.     5. You must take medicine every day to treat your risk factors for stroke.  Be sure to take your medicines exactly as your doctor tells you: no more, no less.  Know what your medicines are for , what they do.  Anti-thrombotics /anticoagulants can help prevent strokes.  You are taking the following medicine(s)  None- ICH     6.  Smoking and second-hand smoke greatly increase your risk of stroke, cardiovascular disease and death. Smoking history never    7. Information provided was Verbal Education    8. Documentation of teaching completed in Patient Education Activity and on Care Plan with teaching response noted?  yes

## 2024-10-20 NOTE — ED NOTES
TRANSFER - OUT REPORT:    Verbal report given to Caity RN on Stevon Callands  being transferred to [place] ICU for routine progression of patient care       Report consisted of patient's Situation, Background, Assessment and   Recommendations(SBAR).     Information from the following report(s) Nurse Handoff Report, ED SBAR, Intake/Output, MAR, Recent Results, Med Rec Status, Cardiac Rhythm NSR, and Neuro Assessment was reviewed with the receiving nurse.    [place] Fall Assessment:    Presents to emergency department  because of falls (Syncope, seizure, or loss of consciousness): No  Age > 70: No  Altered Mental Status, Intoxication with alcohol or substance confusion (Disorientation, impaired judgment, poor safety awaremess, or inability to follow instructions): No  Impaired Mobility: Ambulates or transfers with assistive devices or assistance; Unable to ambulate or transer.: No  Nursing Judgement: No          Lines:   Peripheral IV Left Antecubital (Active)   Site Assessment Clean, dry & intact 10/20/24 0916   Line Status Blood return noted 10/20/24 0916   Phlebitis Assessment No symptoms 10/20/24 0916   Infiltration Assessment 0 10/20/24 0916       Peripheral IV 10/20/24 Right Antecubital (Active)   Site Assessment Clean, dry & intact 10/20/24 1133   Line Status Normal saline locked;Brisk blood return;Flushed 10/20/24 1133   Phlebitis Assessment No symptoms 10/20/24 1133   Infiltration Assessment 0 10/20/24 1133   Dressing Status New dressing applied 10/20/24 1133   Dressing Intervention New 10/20/24 1133        Opportunity for questions and clarification was provided.      Patient transported with:  Monitor    Critical Care transport  Amlodipine gtt, potassium rider, IV fluids @ 250ml/hr

## 2024-10-20 NOTE — ED NOTES
Critical Care transport assuming patient care at this time. Critical Care increasing titration to 10mg/hr of nicardipine drip with SBP goal 130-150.

## 2024-10-20 NOTE — ED NOTES
Verbal order to change NS rate to 250ml/hr d/t IV fluid shortage and need for comfort during K administration.

## 2024-10-21 ENCOUNTER — APPOINTMENT (OUTPATIENT)
Facility: HOSPITAL | Age: 44
DRG: 064 | End: 2024-10-21
Attending: INTERNAL MEDICINE
Payer: COMMERCIAL

## 2024-10-21 PROBLEM — R73.03 PREDIABETES: Status: ACTIVE | Noted: 2024-10-21

## 2024-10-21 PROBLEM — E78.5 HYPERLIPIDEMIA: Status: ACTIVE | Noted: 2024-10-21

## 2024-10-21 LAB
ANION GAP SERPL CALC-SCNC: 3 MMOL/L (ref 2–12)
BUN SERPL-MCNC: 27 MG/DL (ref 6–20)
BUN/CREAT SERPL: 10 (ref 12–20)
CALCIUM SERPL-MCNC: 7.8 MG/DL (ref 8.5–10.1)
CHLORIDE SERPL-SCNC: 112 MMOL/L (ref 97–108)
CHOLEST SERPL-MCNC: 395 MG/DL
CO2 SERPL-SCNC: 27 MMOL/L (ref 21–32)
CREAT SERPL-MCNC: 2.8 MG/DL (ref 0.7–1.3)
ECHO BSA: 2.36 M2
ECHO LA DIAMETER INDEX: 1.5 CM/M2
ECHO LA DIAMETER: 3.4 CM
ECHO LV E' LATERAL VELOCITY: 8.9 CM/S
ECHO LV EF PHYSICIAN: 60 %
ECHO LV FRACTIONAL SHORTENING: 48 % (ref 28–44)
ECHO LV INTERNAL DIMENSION DIASTOLE INDEX: 2.12 CM/M2
ECHO LV INTERNAL DIMENSION DIASTOLIC: 4.8 CM (ref 4.2–5.9)
ECHO LV INTERNAL DIMENSION SYSTOLIC INDEX: 1.11 CM/M2
ECHO LV INTERNAL DIMENSION SYSTOLIC: 2.5 CM
ECHO LV IVSD: 1.3 CM (ref 0.6–1)
ECHO LV MASS 2D: 232.2 G (ref 88–224)
ECHO LV MASS INDEX 2D: 102.8 G/M2 (ref 49–115)
ECHO LV POSTERIOR WALL DIASTOLIC: 1.2 CM (ref 0.6–1)
ECHO LV RELATIVE WALL THICKNESS RATIO: 0.5
ECHO LVOT PEAK GRADIENT: 3 MMHG
ECHO LVOT PEAK VELOCITY: 0.9 M/S
ECHO MV A VELOCITY: 0.06 M/S
ECHO MV E VELOCITY: 0.74 M/S
ECHO MV E/A RATIO: 12.33
ECHO MV E/E' LATERAL: 8.31
EKG ATRIAL RATE: 86 BPM
EKG DIAGNOSIS: NORMAL
EKG P AXIS: 58 DEGREES
EKG P-R INTERVAL: 144 MS
EKG Q-T INTERVAL: 366 MS
EKG QRS DURATION: 104 MS
EKG QTC CALCULATION (BAZETT): 437 MS
EKG R AXIS: 49 DEGREES
EKG T AXIS: 119 DEGREES
EKG VENTRICULAR RATE: 86 BPM
ERYTHROCYTE [DISTWIDTH] IN BLOOD BY AUTOMATED COUNT: 13.8 % (ref 11.5–14.5)
EST. AVERAGE GLUCOSE BLD GHB EST-MCNC: 117 MG/DL
GLUCOSE SERPL-MCNC: 98 MG/DL (ref 65–100)
HBA1C MFR BLD: 5.7 % (ref 4–5.6)
HCT VFR BLD AUTO: 35.8 % (ref 36.6–50.3)
HDLC SERPL-MCNC: 78 MG/DL
HDLC SERPL: 5.1 (ref 0–5)
HGB BLD-MCNC: 11.4 G/DL (ref 12.1–17)
LDLC SERPL CALC-MCNC: 294 MG/DL (ref 0–100)
MCH RBC QN AUTO: 25.9 PG (ref 26–34)
MCHC RBC AUTO-ENTMCNC: 31.8 G/DL (ref 30–36.5)
MCV RBC AUTO: 81.2 FL (ref 80–99)
NRBC # BLD: 0 K/UL (ref 0–0.01)
NRBC BLD-RTO: 0 PER 100 WBC
PLATELET # BLD AUTO: 283 K/UL (ref 150–400)
PMV BLD AUTO: 10.5 FL (ref 8.9–12.9)
POTASSIUM SERPL-SCNC: 3.3 MMOL/L (ref 3.5–5.1)
RBC # BLD AUTO: 4.41 M/UL (ref 4.1–5.7)
SODIUM SERPL-SCNC: 142 MMOL/L (ref 136–145)
TRIGL SERPL-MCNC: 115 MG/DL
VLDLC SERPL CALC-MCNC: 23 MG/DL
WBC # BLD AUTO: 7.5 K/UL (ref 4.1–11.1)

## 2024-10-21 PROCEDURE — 85027 COMPLETE CBC AUTOMATED: CPT

## 2024-10-21 PROCEDURE — 80061 LIPID PANEL: CPT

## 2024-10-21 PROCEDURE — 93010 ELECTROCARDIOGRAM REPORT: CPT | Performed by: SPECIALIST

## 2024-10-21 PROCEDURE — 6370000000 HC RX 637 (ALT 250 FOR IP): Performed by: NURSE PRACTITIONER

## 2024-10-21 PROCEDURE — 6360000002 HC RX W HCPCS

## 2024-10-21 PROCEDURE — 97530 THERAPEUTIC ACTIVITIES: CPT

## 2024-10-21 PROCEDURE — 93306 TTE W/DOPPLER COMPLETE: CPT

## 2024-10-21 PROCEDURE — 99233 SBSQ HOSP IP/OBS HIGH 50: CPT | Performed by: PSYCHIATRY & NEUROLOGY

## 2024-10-21 PROCEDURE — 6370000000 HC RX 637 (ALT 250 FOR IP)

## 2024-10-21 PROCEDURE — 6360000002 HC RX W HCPCS: Performed by: NURSE PRACTITIONER

## 2024-10-21 PROCEDURE — APPNB45 APP NON BILLABLE 31-45 MINUTES

## 2024-10-21 PROCEDURE — 97165 OT EVAL LOW COMPLEX 30 MIN: CPT

## 2024-10-21 PROCEDURE — 97161 PT EVAL LOW COMPLEX 20 MIN: CPT

## 2024-10-21 PROCEDURE — 36415 COLL VENOUS BLD VENIPUNCTURE: CPT

## 2024-10-21 PROCEDURE — 92612 ENDOSCOPY SWALLOW (FEES) VID: CPT

## 2024-10-21 PROCEDURE — 2000000000 HC ICU R&B

## 2024-10-21 PROCEDURE — 83036 HEMOGLOBIN GLYCOSYLATED A1C: CPT

## 2024-10-21 PROCEDURE — 80048 BASIC METABOLIC PNL TOTAL CA: CPT

## 2024-10-21 PROCEDURE — 92522 EVALUATE SPEECH PRODUCTION: CPT

## 2024-10-21 PROCEDURE — 93306 TTE W/DOPPLER COMPLETE: CPT | Performed by: INTERNAL MEDICINE

## 2024-10-21 RX ORDER — AMLODIPINE BESYLATE 5 MG/1
5 TABLET ORAL DAILY
Status: DISCONTINUED | OUTPATIENT
Start: 2024-10-21 | End: 2024-10-21

## 2024-10-21 RX ORDER — LABETALOL HYDROCHLORIDE 5 MG/ML
10 INJECTION, SOLUTION INTRAVENOUS EVERY 4 HOURS PRN
Status: DISCONTINUED | OUTPATIENT
Start: 2024-10-21 | End: 2024-10-30 | Stop reason: HOSPADM

## 2024-10-21 RX ORDER — POTASSIUM CHLORIDE 750 MG/1
40 TABLET, EXTENDED RELEASE ORAL ONCE
Status: COMPLETED | OUTPATIENT
Start: 2024-10-21 | End: 2024-10-21

## 2024-10-21 RX ORDER — AMLODIPINE BESYLATE 5 MG/1
5 TABLET ORAL ONCE
Status: COMPLETED | OUTPATIENT
Start: 2024-10-21 | End: 2024-10-21

## 2024-10-21 RX ORDER — HYDRALAZINE HYDROCHLORIDE 25 MG/1
50 TABLET, FILM COATED ORAL EVERY 8 HOURS SCHEDULED
Status: DISCONTINUED | OUTPATIENT
Start: 2024-10-21 | End: 2024-10-22

## 2024-10-21 RX ORDER — AMLODIPINE BESYLATE 5 MG/1
10 TABLET ORAL DAILY
Status: DISCONTINUED | OUTPATIENT
Start: 2024-10-22 | End: 2024-10-30 | Stop reason: HOSPADM

## 2024-10-21 RX ADMIN — HYDRALAZINE HYDROCHLORIDE 10 MG: 20 INJECTION INTRAMUSCULAR; INTRAVENOUS at 15:06

## 2024-10-21 RX ADMIN — ATORVASTATIN CALCIUM 80 MG: 40 TABLET, FILM COATED ORAL at 21:37

## 2024-10-21 RX ADMIN — HYDRALAZINE HYDROCHLORIDE 50 MG: 25 TABLET ORAL at 22:02

## 2024-10-21 RX ADMIN — NICARDIPINE HYDROCHLORIDE 5 MG/HR: 0.1 INJECTION, SOLUTION INTRAVENOUS at 15:17

## 2024-10-21 RX ADMIN — AMLODIPINE BESYLATE 5 MG: 5 TABLET ORAL at 10:26

## 2024-10-21 RX ADMIN — LABETALOL HYDROCHLORIDE 10 MG: 5 INJECTION INTRAVENOUS at 11:32

## 2024-10-21 RX ADMIN — NICARDIPINE HYDROCHLORIDE 2.5 MG/HR: 0.1 INJECTION, SOLUTION INTRAVENOUS at 08:32

## 2024-10-21 RX ADMIN — POTASSIUM CHLORIDE 40 MEQ: 750 TABLET, EXTENDED RELEASE ORAL at 08:28

## 2024-10-21 RX ADMIN — NICARDIPINE HYDROCHLORIDE 5 MG/HR: 0.1 INJECTION, SOLUTION INTRAVENOUS at 00:15

## 2024-10-21 RX ADMIN — NICARDIPINE HYDROCHLORIDE 7.5 MG/HR: 0.1 INJECTION, SOLUTION INTRAVENOUS at 23:06

## 2024-10-21 RX ADMIN — NICARDIPINE HYDROCHLORIDE 5 MG/HR: 0.1 INJECTION, SOLUTION INTRAVENOUS at 19:25

## 2024-10-21 RX ADMIN — AMLODIPINE BESYLATE 5 MG: 5 TABLET ORAL at 13:20

## 2024-10-21 RX ADMIN — HYDRALAZINE HYDROCHLORIDE 10 MG: 20 INJECTION INTRAMUSCULAR; INTRAVENOUS at 00:00

## 2024-10-21 RX ADMIN — LABETALOL HYDROCHLORIDE 10 MG: 5 INJECTION INTRAVENOUS at 18:17

## 2024-10-21 RX ADMIN — NICARDIPINE HYDROCHLORIDE 2.5 MG/HR: 0.1 INJECTION, SOLUTION INTRAVENOUS at 10:27

## 2024-10-21 RX ADMIN — HYDRALAZINE HYDROCHLORIDE 10 MG: 20 INJECTION INTRAMUSCULAR; INTRAVENOUS at 19:26

## 2024-10-21 ASSESSMENT — PAIN SCALES - GENERAL
PAINLEVEL_OUTOF10: 0

## 2024-10-21 NOTE — CARE COORDINATION
Care Management Initial Assessment       RUR:12% Low   Readmission? No, patient is a transfer from [facility]     10/21/24 1020   Service Assessment   Patient Orientation Alert and Oriented;Person;Place;Situation;Self   Cognition Alert   History Provided By Patient   Primary Caregiver Self   Support Systems Parent  (Mother Carol Allison [phone])   Patient's Healthcare Decision Maker is: Legal Next of Kin   PCP Verified by CM Yes   Prior Functional Level Independent in ADLs/IADLs   Current Functional Level Assistance with the following:;Bathing;Toileting;Mobility   Can patient return to prior living arrangement Unknown at present   Ability to make needs known: Good   Family able to assist with home care needs: Other (comment)  (unkown)   Would you like for me to discuss the discharge plan with any other family members/significant others, and if so, who? Yes  (Mother)   Community Resources None   Social/Functional History   Lives With Alone   Home Layout Two level   Home Access Stairs to enter with rails   Entrance Stairs - Number of Steps 4   Entrance Stairs - Rails Both   Bathroom Shower/Tub Walk-in shower   Bathroom Toilet Standard   Bathroom Equipment None   Home Equipment None   Receives Help From Family   ADL Assistance Independent   Homemaking Assistance Independent   Ambulation Assistance Independent   Transfer Assistance Independent   Active  Yes   Mode of Transportation Car   Occupation Full time employment   Type of Occupation Works in IT at [employer]   Discharge Planning   Living Arrangements Alone   Current Services Prior To Admission None   Potential Assistance Needed N/A   Potential Assistance Purchasing Medications No   History of falls? 0     Patient presented to the ED at [facility] with hypertension, dizziness and nausea. Per patient he has not taken his B/P meds for 6 months. Patient placed on a Cardene gtt prior to transfer.    CM met with patient to introduce self and explain role. Patient lives

## 2024-10-22 ENCOUNTER — TELEPHONE (OUTPATIENT)
Facility: HOSPITAL | Age: 44
End: 2024-10-22

## 2024-10-22 LAB
ANION GAP SERPL CALC-SCNC: 5 MMOL/L (ref 2–12)
BUN SERPL-MCNC: 27 MG/DL (ref 6–20)
BUN/CREAT SERPL: 9 (ref 12–20)
CALCIUM SERPL-MCNC: 8.4 MG/DL (ref 8.5–10.1)
CHLORIDE SERPL-SCNC: 112 MMOL/L (ref 97–108)
CO2 SERPL-SCNC: 22 MMOL/L (ref 21–32)
CREAT SERPL-MCNC: 2.9 MG/DL (ref 0.7–1.3)
ERYTHROCYTE [DISTWIDTH] IN BLOOD BY AUTOMATED COUNT: 14.2 % (ref 11.5–14.5)
GLUCOSE SERPL-MCNC: 108 MG/DL (ref 65–100)
HCT VFR BLD AUTO: 39.1 % (ref 36.6–50.3)
HGB BLD-MCNC: 12.7 G/DL (ref 12.1–17)
MCH RBC QN AUTO: 26.2 PG (ref 26–34)
MCHC RBC AUTO-ENTMCNC: 32.5 G/DL (ref 30–36.5)
MCV RBC AUTO: 80.6 FL (ref 80–99)
NRBC # BLD: 0 K/UL (ref 0–0.01)
NRBC BLD-RTO: 0 PER 100 WBC
PLATELET # BLD AUTO: 262 K/UL (ref 150–400)
PMV BLD AUTO: 10.7 FL (ref 8.9–12.9)
POTASSIUM SERPL-SCNC: 3.4 MMOL/L (ref 3.5–5.1)
RBC # BLD AUTO: 4.85 M/UL (ref 4.1–5.7)
SODIUM SERPL-SCNC: 139 MMOL/L (ref 136–145)
WBC # BLD AUTO: 7.8 K/UL (ref 4.1–11.1)

## 2024-10-22 PROCEDURE — 99233 SBSQ HOSP IP/OBS HIGH 50: CPT | Performed by: NURSE PRACTITIONER

## 2024-10-22 PROCEDURE — 6370000000 HC RX 637 (ALT 250 FOR IP)

## 2024-10-22 PROCEDURE — 6370000000 HC RX 637 (ALT 250 FOR IP): Performed by: NURSE PRACTITIONER

## 2024-10-22 PROCEDURE — 80048 BASIC METABOLIC PNL TOTAL CA: CPT

## 2024-10-22 PROCEDURE — 92507 TX SP LANG VOICE COMM INDIV: CPT

## 2024-10-22 PROCEDURE — 36415 COLL VENOUS BLD VENIPUNCTURE: CPT

## 2024-10-22 PROCEDURE — 97530 THERAPEUTIC ACTIVITIES: CPT

## 2024-10-22 PROCEDURE — 85027 COMPLETE CBC AUTOMATED: CPT

## 2024-10-22 PROCEDURE — 2000000000 HC ICU R&B

## 2024-10-22 PROCEDURE — 2580000003 HC RX 258: Performed by: NURSE PRACTITIONER

## 2024-10-22 PROCEDURE — 6360000002 HC RX W HCPCS

## 2024-10-22 PROCEDURE — 92526 ORAL FUNCTION THERAPY: CPT

## 2024-10-22 PROCEDURE — 97535 SELF CARE MNGMENT TRAINING: CPT

## 2024-10-22 PROCEDURE — 6360000002 HC RX W HCPCS: Performed by: NURSE PRACTITIONER

## 2024-10-22 PROCEDURE — 97116 GAIT TRAINING THERAPY: CPT

## 2024-10-22 RX ORDER — HYDRALAZINE HYDROCHLORIDE 50 MG/1
100 TABLET, FILM COATED ORAL EVERY 8 HOURS SCHEDULED
Status: DISCONTINUED | OUTPATIENT
Start: 2024-10-22 | End: 2024-10-30 | Stop reason: HOSPADM

## 2024-10-22 RX ORDER — CARVEDILOL 12.5 MG/1
25 TABLET ORAL 2 TIMES DAILY WITH MEALS
Status: DISCONTINUED | OUTPATIENT
Start: 2024-10-22 | End: 2024-10-30 | Stop reason: HOSPADM

## 2024-10-22 RX ORDER — HYDRALAZINE HYDROCHLORIDE 25 MG/1
50 TABLET, FILM COATED ORAL ONCE
Status: COMPLETED | OUTPATIENT
Start: 2024-10-22 | End: 2024-10-22

## 2024-10-22 RX ADMIN — HYDRALAZINE HYDROCHLORIDE 100 MG: 50 TABLET ORAL at 21:35

## 2024-10-22 RX ADMIN — POTASSIUM BICARBONATE 40 MEQ: 782 TABLET, EFFERVESCENT ORAL at 20:00

## 2024-10-22 RX ADMIN — AMLODIPINE BESYLATE 10 MG: 5 TABLET ORAL at 07:40

## 2024-10-22 RX ADMIN — LABETALOL HYDROCHLORIDE 10 MG: 5 INJECTION INTRAVENOUS at 20:53

## 2024-10-22 RX ADMIN — ONDANSETRON 4 MG: 2 INJECTION INTRAMUSCULAR; INTRAVENOUS at 11:12

## 2024-10-22 RX ADMIN — HYDRALAZINE HYDROCHLORIDE 100 MG: 50 TABLET ORAL at 13:15

## 2024-10-22 RX ADMIN — LABETALOL HYDROCHLORIDE 10 MG: 5 INJECTION INTRAVENOUS at 15:11

## 2024-10-22 RX ADMIN — CARVEDILOL 25 MG: 12.5 TABLET, FILM COATED ORAL at 09:14

## 2024-10-22 RX ADMIN — POTASSIUM BICARBONATE 40 MEQ: 782 TABLET, EFFERVESCENT ORAL at 11:50

## 2024-10-22 RX ADMIN — NICARDIPINE HYDROCHLORIDE 10 MG/HR: 0.1 INJECTION, SOLUTION INTRAVENOUS at 09:19

## 2024-10-22 RX ADMIN — HYDRALAZINE HYDROCHLORIDE 50 MG: 25 TABLET ORAL at 05:32

## 2024-10-22 RX ADMIN — NICARDIPINE HYDROCHLORIDE 7.5 MG/HR: 0.1 INJECTION, SOLUTION INTRAVENOUS at 01:49

## 2024-10-22 RX ADMIN — HYDRALAZINE HYDROCHLORIDE 50 MG: 25 TABLET ORAL at 09:14

## 2024-10-22 RX ADMIN — NICARDIPINE HYDROCHLORIDE 10 MG/HR: 0.1 INJECTION, SOLUTION INTRAVENOUS at 03:56

## 2024-10-22 RX ADMIN — NICARDIPINE HYDROCHLORIDE 12.5 MG/HR: 0.1 INJECTION, SOLUTION INTRAVENOUS at 05:51

## 2024-10-22 RX ADMIN — CARVEDILOL 25 MG: 12.5 TABLET, FILM COATED ORAL at 16:22

## 2024-10-22 RX ADMIN — SODIUM CHLORIDE, PRESERVATIVE FREE 10 ML: 5 INJECTION INTRAVENOUS at 20:01

## 2024-10-22 RX ADMIN — ATORVASTATIN CALCIUM 80 MG: 40 TABLET, FILM COATED ORAL at 19:59

## 2024-10-22 RX ADMIN — SODIUM CHLORIDE, PRESERVATIVE FREE 10 ML: 5 INJECTION INTRAVENOUS at 07:40

## 2024-10-22 RX ADMIN — NICARDIPINE HYDROCHLORIDE 12.5 MG/HR: 0.1 INJECTION, SOLUTION INTRAVENOUS at 07:40

## 2024-10-22 ASSESSMENT — PAIN SCALES - GENERAL: PAINLEVEL_OUTOF10: 0

## 2024-10-22 NOTE — TELEPHONE ENCOUNTER
Pt needs a hospital follow up appointment  Provider: Any  Location: [facility] clinic  In person or virtual: In person  When: 4-8 weeks   Diagnosis/reason for follow up:  pontine IPH  Additional information:

## 2024-10-23 ENCOUNTER — APPOINTMENT (OUTPATIENT)
Facility: HOSPITAL | Age: 44
DRG: 064 | End: 2024-10-23
Attending: INTERNAL MEDICINE
Payer: COMMERCIAL

## 2024-10-23 LAB
ANION GAP SERPL CALC-SCNC: 5 MMOL/L (ref 2–12)
BUN SERPL-MCNC: 25 MG/DL (ref 6–20)
BUN/CREAT SERPL: 9 (ref 12–20)
CALCIUM SERPL-MCNC: 8.2 MG/DL (ref 8.5–10.1)
CHLORIDE SERPL-SCNC: 113 MMOL/L (ref 97–108)
CO2 SERPL-SCNC: 24 MMOL/L (ref 21–32)
CREAT SERPL-MCNC: 2.92 MG/DL (ref 0.7–1.3)
ERYTHROCYTE [DISTWIDTH] IN BLOOD BY AUTOMATED COUNT: 14.6 % (ref 11.5–14.5)
GLUCOSE SERPL-MCNC: 94 MG/DL (ref 65–100)
HCT VFR BLD AUTO: 38.2 % (ref 36.6–50.3)
HGB BLD-MCNC: 12.2 G/DL (ref 12.1–17)
MCH RBC QN AUTO: 26 PG (ref 26–34)
MCHC RBC AUTO-ENTMCNC: 31.9 G/DL (ref 30–36.5)
MCV RBC AUTO: 81.3 FL (ref 80–99)
NRBC # BLD: 0 K/UL (ref 0–0.01)
NRBC BLD-RTO: 0 PER 100 WBC
PLATELET # BLD AUTO: 347 K/UL (ref 150–400)
PMV BLD AUTO: 10.3 FL (ref 8.9–12.9)
POTASSIUM SERPL-SCNC: 3.8 MMOL/L (ref 3.5–5.1)
RBC # BLD AUTO: 4.7 M/UL (ref 4.1–5.7)
SODIUM SERPL-SCNC: 142 MMOL/L (ref 136–145)
WBC # BLD AUTO: 7.3 K/UL (ref 4.1–11.1)

## 2024-10-23 PROCEDURE — 6370000000 HC RX 637 (ALT 250 FOR IP)

## 2024-10-23 PROCEDURE — 76770 US EXAM ABDO BACK WALL COMP: CPT

## 2024-10-23 PROCEDURE — 2060000000 HC ICU INTERMEDIATE R&B

## 2024-10-23 PROCEDURE — 2580000003 HC RX 258: Performed by: NURSE PRACTITIONER

## 2024-10-23 PROCEDURE — 97116 GAIT TRAINING THERAPY: CPT

## 2024-10-23 PROCEDURE — 76775 US EXAM ABDO BACK WALL LIM: CPT

## 2024-10-23 PROCEDURE — 85027 COMPLETE CBC AUTOMATED: CPT

## 2024-10-23 PROCEDURE — 6370000000 HC RX 637 (ALT 250 FOR IP): Performed by: NURSE PRACTITIONER

## 2024-10-23 PROCEDURE — 36415 COLL VENOUS BLD VENIPUNCTURE: CPT

## 2024-10-23 PROCEDURE — 6360000002 HC RX W HCPCS

## 2024-10-23 PROCEDURE — 80048 BASIC METABOLIC PNL TOTAL CA: CPT

## 2024-10-23 RX ORDER — SENNA AND DOCUSATE SODIUM 50; 8.6 MG/1; MG/1
1 TABLET, FILM COATED ORAL DAILY
Status: DISCONTINUED | OUTPATIENT
Start: 2024-10-23 | End: 2024-10-30 | Stop reason: HOSPADM

## 2024-10-23 RX ORDER — CLONIDINE HYDROCHLORIDE 0.1 MG/1
0.1 TABLET ORAL 2 TIMES DAILY
Status: DISCONTINUED | OUTPATIENT
Start: 2024-10-23 | End: 2024-10-24

## 2024-10-23 RX ADMIN — HYDRALAZINE HYDROCHLORIDE 100 MG: 50 TABLET ORAL at 04:43

## 2024-10-23 RX ADMIN — CARVEDILOL 25 MG: 12.5 TABLET, FILM COATED ORAL at 18:48

## 2024-10-23 RX ADMIN — HYDRALAZINE HYDROCHLORIDE 100 MG: 50 TABLET ORAL at 20:52

## 2024-10-23 RX ADMIN — CLONIDINE HYDROCHLORIDE 0.1 MG: 0.1 TABLET ORAL at 20:52

## 2024-10-23 RX ADMIN — ATORVASTATIN CALCIUM 80 MG: 40 TABLET, FILM COATED ORAL at 20:52

## 2024-10-23 RX ADMIN — CLONIDINE HYDROCHLORIDE 0.1 MG: 0.1 TABLET ORAL at 09:06

## 2024-10-23 RX ADMIN — CARVEDILOL 25 MG: 12.5 TABLET, FILM COATED ORAL at 08:16

## 2024-10-23 RX ADMIN — SENNOSIDES AND DOCUSATE SODIUM 1 TABLET: 50; 8.6 TABLET ORAL at 09:06

## 2024-10-23 RX ADMIN — SODIUM CHLORIDE, PRESERVATIVE FREE 10 ML: 5 INJECTION INTRAVENOUS at 20:53

## 2024-10-23 RX ADMIN — AMLODIPINE BESYLATE 10 MG: 5 TABLET ORAL at 08:16

## 2024-10-23 RX ADMIN — LABETALOL HYDROCHLORIDE 10 MG: 5 INJECTION INTRAVENOUS at 01:34

## 2024-10-23 RX ADMIN — LABETALOL HYDROCHLORIDE 10 MG: 5 INJECTION INTRAVENOUS at 05:19

## 2024-10-23 RX ADMIN — HYDRALAZINE HYDROCHLORIDE 100 MG: 50 TABLET ORAL at 14:40

## 2024-10-23 ASSESSMENT — PAIN SCALES - GENERAL
PAINLEVEL_OUTOF10: 0
PAINLEVEL_OUTOF10: 0

## 2024-10-23 NOTE — H&P
CRITICAL CARE HISTORY & PHYSICAL    Name: Stevon Callands   : 1980   MRN: [id number]   Date: 10/20/2024      REASON FOR ICU ADMISSION:  Pontine hemorrhage & hypertensive emergency     PRINCIPAL ICU DIAGNOSIS   Hypertensive emergency  KILEY  Pontine hemorrhage    BRIEF PATIENT SUMMARY   Mr. Allison is a 43 yo  male who woke up this morning very dizzy and said, \"I just didn't feel right.\" He got up to go the bathroom and had to lay back down for fear of falling over and diffuse nausea. His son called 911 after he felt off and knew he needed to be seen. He has been off all his BP meds since around April after going through a split. He has been unable to get a new PCP appointment after his PCP left and therefore has been on no medications. In the ED, he was found to have a BP of 220/120. Neuro exam was notable for vertical nystagmus and right upper extremity dysmetria and some numbness to right and upper and lower extremities. Labs with some KILEY and TWI on EKG but no ST changes and a negative Troponin. CTH done with michell hemorrhage with some localized edema and no shift. Started on Nicardipine gtt and transferred to [facility].     Past Medical History:   Diagnosis Date    Hypertension      No past surgical history on file.    Denies tobacco use  Denies illicit drug use or marijuana use  Drinks socially (<1 per week)   but going through divorce and 2 children ages 14 and 6  No known drug allergies  Not taking any medications    COMPREHENSIVE ASSESSMENT & PLAN:SYSTEM BASED     24 HOUR EVENTS: Admitted    NEUROLOGICAL:   Intraparenchymal hemorrhage  - Related to hypertensive emergency  - Stroke precautions  - Q1h neuro checks  - Hold ASA  - MRI Brain WO  - Check FLP and HgbA1C  - PT/OT/SLP  - Discussed with Neurosurgery, Dr. Gallo, and note to follow. No intervention  - Tylenol PRN for HA    PULMONOLOGY:   - Maintain O2 sats > 92%  - Pulmonary toilet    CARDIOVASCULAR:   Hypertensive 
Compression Device  FUNCTIONAL STATUS PRIOR TO HOSPITALIZATION: Independent  Ambulatory status/function: Normal  EARLY MOBILITY ASSESSMENT: 5-supervision  ANTICIPATED DISCHARGE: 2-3 days  ANTICIPATED DISPOSITION: Rehab  EMERGENCY CONTACT/SURROGATE DECISION MAKER: Carol Allison (mother): [phone]    CRITICAL CARE WAS PERFORMED FOR THIS ENCOUNTER: N/A      Signed By: CYNDI Perez NP     October 23, 2024

## 2024-10-23 NOTE — CARE COORDINATION
Transition of Care Plan:    RUR: 11%   Prior Level of Functioning: Independent   Disposition: IPR  If SNF or IPR: Date FOC offered: 10/23/24  Date FOC received: 10/23/24   Follow up appointments: PCP Neurology   DME needed: TBD  Transportation at discharge: Family vs BLS   IM/IMM Medicare/ letter given: NA  Is patient a  and connected with VA? No  Caregiver Contact: Mother Carol Callands [phone]  Discharge Caregiver contacted prior to discharge? No  Care Conference needed? No   Barriers to discharge:    Therapy is recommending IPR. Spoke with Hospitalist NP, she is in agreement. Provided patient with the list o facilities and he has chosen [facility], referral made through [facility].   Marybel Trujillo RN,Care Management    2:00 pm Patient has been accepted to [facility], they will start insurance auth today.  Marybel Trujillo RN,Care Management

## 2024-10-23 NOTE — CONSULTS
Neurology Consult Note     NAME: Stevon Callands   :  1980   MRN:  [id number]   DATE:  10/20/2024       HPI:  Pt is a 45yo RH  male admitted 10/20/24 on transfer from [place] ED after presenting with c/o HTN, dizziness (both spinning and lightheadedness), imbalance, N/V, diaphoretic. Reported non-compliant with antihypertensive meds x 6 months. Per EMR, he was previously on 4 antihypertensive meds. /121. Presented with hypertensive emergency in 2021 at which time he was diagnosed with HTN, CKD, and tx was initiated. CTH with left pontine IPH. CTA H/N no vascular abnormality seen, no significant stenosis. CTP neg.  BUN/Cr 33/3.12, Glu 181. HgbA1C and LDL pending. EKG - NSR, incomplete RBBB, ST and T wave abnormality, ?lateral ischemia.  TTE pending. Pt dizziness is improved.  Denies diplopia, no hiccups, no speech or swallowing difficulties, no weakness. +right arm and leg numbness.  No prior history of CVA.  Denies any known JOSELO, does snore.  Not on APT/OAC at home.       PMH:  CKD  HTN  Morbid Obesity        ROS:  Per HPI otherwise reviewed and negative      MEDS:  Home:  Not taking any meds  Current Outpatient Medications   Medication Instructions    amLODIPine (NORVASC) 10 MG tablet amlodipine 10 mg tablet   TAKE 1 TABLET BY MOUTH DAILY    carvedilol (COREG) 25 mg, Oral, 2 TIMES DAILY    chlorthalidone (HYGROTON) 25 mg, Oral, DAILY    doxazosin (CARDURA) 2 mg, Oral, NIGHTLY    ergocalciferol (ERGOCALCIFEROL) 1.25 MG (99982 UT) capsule TAKE 1 CAPSULE BY MOUTH EVERY WEEK FOR 6 WEEKS           Current Facility-Administered Medications:     labetalol (NORMODYNE;TRANDATE) 5 MG/ML injection, , , ,     sodium chloride flush 0.9 % injection 5-40 mL, 5-40 mL, IntraVENous, 2 times per day, Julia Kidd APRN - NP    sodium chloride flush 0.9 % injection 5-40 
                       [place] [id number]                              CONSULTATION      PATIENT NAME: BERRY WATTERS              : 1980  MED REC NO: [id number]                       ROOM: 7119  ACCOUNT NO: [id number]                       ADMIT DATE: 10/20/2024  PROVIDER: Bebeto Grigsby MD    DATE OF SERVICE:  10/20/2024    ATTENDING PHYSICIAN:  NICHOL ROBERTS    HISTORY OF PRESENT ILLNESS:  I just received a text message from the transfer center, noting that this patient was being transferred to [facility] because of a \"brain bleed.\"  I do not have access to writing a note in this patient's chart as they are at an outside institution, however, I am able to see the CT scan.  It shows a small focal pontine hemorrhage without any hydrocephalus or mass effect or any other evidence of hemorrhage.  The patient apparently has a blood pressure of 215/115 according to the medical record chart.  This is not a neurosurgical problem for which we would recommend any surgical intervention.  Therefore, if the patient does transfer, I will defer care to Neurology or the ICU staff or the hospitalist.  Again, there is no need for neurosurgical intervention for brainstem hemorrhage.  Should the patient's condition change or the CT change or hydrocephalus develops, we will be happy to re-evaluate on request.        BEBETO GRIGSBY MD      JWM/AQS  D:  10/20/2024 11:27:10  T:  10/20/2024 12:53:04  JOB #:  [id number]    CC:   Bebeto Grigsby MD  
past surgical history on file.    Medications:  Prior to Admission medications    Medication Sig Start Date End Date Taking? Authorizing Provider   amLODIPine (NORVASC) 10 MG tablet amlodipine 10 mg tablet   TAKE 1 TABLET BY MOUTH DAILY    Automatic Reconciliation, Ar   carvedilol (COREG) 25 MG tablet Take 1 tablet by mouth 2 times daily 12/9/21   Automatic Reconciliation, Ar   chlorthalidone (HYGROTON) 25 MG tablet Take 1 tablet by mouth daily 6/13/22   Automatic Reconciliation, Ar   doxazosin (CARDURA) 2 MG tablet Take 1 tablet by mouth nightly 12/9/21   Automatic Reconciliation, Ar   ergocalciferol (ERGOCALCIFEROL) 1.25 MG (04856 UT) capsule TAKE 1 CAPSULE BY MOUTH EVERY WEEK FOR 6 WEEKS 11/19/21   Automatic Reconciliation, Ar       No Known Allergies    Social Hx:  reports that he has never smoked. He has never used smokeless tobacco. He reports current alcohol use. He reports that he does not use drugs.     No family history on file.    Review of Systems:  A twelve point review of system was performed today. Pertinent positives and negatives are mentioned in the HPI. The reminder of the ROS is negative and noncontributory.     Objective:    Vitals:    Vitals:    10/23/24 0700 10/23/24 0800 10/23/24 1000 10/23/24 1100   BP: 132/69 136/82 (!) 143/80 115/67   Pulse: 69 52 78 71   Resp: 19 19 21 17   Temp:  98.9 °F (37.2 °C)     TempSrc:  Oral     SpO2: 91% 94% 93% 91%   Weight:         I&O's:  10/22 0701 - 10/23 0700  In: 853.5 [P.O.:240; I.V.:613.5]  Out: 1775 [Urine:1775]  /67   Pulse 71   Temp 98.9 °F (37.2 °C) (Oral)   Resp 17   Wt 115.2 kg (253 lb 15.5 oz)   SpO2 91%   BMI 39.86 kg/m²     Physical Exam:  General:Alert, No distress,   HEENT: Eyes are PERRL.  Conjunctiva without pallor ,erythema.  The sclerae without icterus. .   Neck:Supple,no mass palpable  Lungs : Clears to auscultation Bilaterally, Normal respiratory effort  CVS: RRR, S1 S2 normal, No rub, no LE edema  Abdomen: Soft, Non tender,

## 2024-10-24 LAB
ANION GAP SERPL CALC-SCNC: 8 MMOL/L (ref 2–12)
BASOPHILS # BLD: 0 K/UL (ref 0–0.1)
BASOPHILS NFR BLD: 0 % (ref 0–1)
BUN SERPL-MCNC: 30 MG/DL (ref 6–20)
BUN/CREAT SERPL: 9 (ref 12–20)
CALCIUM SERPL-MCNC: 8.6 MG/DL (ref 8.5–10.1)
CHLORIDE SERPL-SCNC: 109 MMOL/L (ref 97–108)
CO2 SERPL-SCNC: 24 MMOL/L (ref 21–32)
CREAT SERPL-MCNC: 3.27 MG/DL (ref 0.7–1.3)
DIFFERENTIAL METHOD BLD: ABNORMAL
EOSINOPHIL # BLD: 0.1 K/UL (ref 0–0.4)
EOSINOPHIL NFR BLD: 2 % (ref 0–7)
ERYTHROCYTE [DISTWIDTH] IN BLOOD BY AUTOMATED COUNT: 14.6 % (ref 11.5–14.5)
GLUCOSE SERPL-MCNC: 106 MG/DL (ref 65–100)
HCT VFR BLD AUTO: 39 % (ref 36.6–50.3)
HGB BLD-MCNC: 12.4 G/DL (ref 12.1–17)
IMM GRANULOCYTES # BLD AUTO: 0 K/UL (ref 0–0.04)
IMM GRANULOCYTES NFR BLD AUTO: 0 % (ref 0–0.5)
LYMPHOCYTES # BLD: 2.1 K/UL (ref 0.8–3.5)
LYMPHOCYTES NFR BLD: 31 % (ref 12–49)
MCH RBC QN AUTO: 25.7 PG (ref 26–34)
MCHC RBC AUTO-ENTMCNC: 31.8 G/DL (ref 30–36.5)
MCV RBC AUTO: 80.7 FL (ref 80–99)
MONOCYTES # BLD: 0.7 K/UL (ref 0–1)
MONOCYTES NFR BLD: 10 % (ref 5–13)
NEUTS SEG # BLD: 3.7 K/UL (ref 1.8–8)
NEUTS SEG NFR BLD: 57 % (ref 32–75)
NRBC # BLD: 0 K/UL (ref 0–0.01)
NRBC BLD-RTO: 0 PER 100 WBC
PLATELET # BLD AUTO: 346 K/UL (ref 150–400)
PMV BLD AUTO: 10.4 FL (ref 8.9–12.9)
POTASSIUM SERPL-SCNC: 3.6 MMOL/L (ref 3.5–5.1)
RBC # BLD AUTO: 4.83 M/UL (ref 4.1–5.7)
SODIUM SERPL-SCNC: 141 MMOL/L (ref 136–145)
WBC # BLD AUTO: 6.6 K/UL (ref 4.1–11.1)

## 2024-10-24 PROCEDURE — 2580000003 HC RX 258: Performed by: NURSE PRACTITIONER

## 2024-10-24 PROCEDURE — 6370000000 HC RX 637 (ALT 250 FOR IP): Performed by: NURSE PRACTITIONER

## 2024-10-24 PROCEDURE — 97116 GAIT TRAINING THERAPY: CPT

## 2024-10-24 PROCEDURE — 2060000000 HC ICU INTERMEDIATE R&B

## 2024-10-24 PROCEDURE — 6370000000 HC RX 637 (ALT 250 FOR IP)

## 2024-10-24 PROCEDURE — 97530 THERAPEUTIC ACTIVITIES: CPT

## 2024-10-24 PROCEDURE — 80048 BASIC METABOLIC PNL TOTAL CA: CPT

## 2024-10-24 PROCEDURE — 85025 COMPLETE CBC W/AUTO DIFF WBC: CPT

## 2024-10-24 RX ADMIN — SODIUM CHLORIDE, PRESERVATIVE FREE 10 ML: 5 INJECTION INTRAVENOUS at 22:41

## 2024-10-24 RX ADMIN — HYDRALAZINE HYDROCHLORIDE 100 MG: 50 TABLET ORAL at 06:15

## 2024-10-24 RX ADMIN — CLONIDINE HYDROCHLORIDE 0.1 MG: 0.1 TABLET ORAL at 08:53

## 2024-10-24 RX ADMIN — AMLODIPINE BESYLATE 10 MG: 5 TABLET ORAL at 08:54

## 2024-10-24 RX ADMIN — HYDRALAZINE HYDROCHLORIDE 100 MG: 50 TABLET ORAL at 22:38

## 2024-10-24 RX ADMIN — CARVEDILOL 25 MG: 12.5 TABLET, FILM COATED ORAL at 17:54

## 2024-10-24 RX ADMIN — ATORVASTATIN CALCIUM 80 MG: 40 TABLET, FILM COATED ORAL at 22:38

## 2024-10-24 RX ADMIN — SODIUM CHLORIDE, PRESERVATIVE FREE 10 ML: 5 INJECTION INTRAVENOUS at 08:53

## 2024-10-24 RX ADMIN — CARVEDILOL 25 MG: 12.5 TABLET, FILM COATED ORAL at 08:54

## 2024-10-24 RX ADMIN — HYDRALAZINE HYDROCHLORIDE 100 MG: 50 TABLET ORAL at 15:22

## 2024-10-24 ASSESSMENT — PAIN SCALES - GENERAL: PAINLEVEL_OUTOF10: 0

## 2024-10-24 NOTE — CARE COORDINATION
Transition of Care Plan:    IPR - [facility]; pending insurance auth through [employer] started 10/23    Transport: BLS    RUR: 11%   Prior Level of Functioning: Independent   Disposition: IPR  If SNF or IPR: Date FOC offered: 10/23/24  Date FOC received: 10/23/24   Follow up appointments: PCP Neurology   DME needed: TBD  Transportation at discharge: Family vs BLS   IM/IMM Medicare/ letter given: NA  Is patient a  and connected with VA? No  Caregiver Contact: Mother Carol Callands [phone]  Discharge Caregiver contacted prior to discharge? No  Care Conference needed? No   Barriers to discharge:  Auth - started 10/23    PM&R consulted to support insurance auth for IPR at [facility]; CM will follow.    REYMUNDO Morales (Ally).

## 2024-10-25 PROCEDURE — 6360000002 HC RX W HCPCS

## 2024-10-25 PROCEDURE — 97116 GAIT TRAINING THERAPY: CPT

## 2024-10-25 PROCEDURE — 2060000000 HC ICU INTERMEDIATE R&B

## 2024-10-25 PROCEDURE — 6370000000 HC RX 637 (ALT 250 FOR IP): Performed by: NURSE PRACTITIONER

## 2024-10-25 PROCEDURE — 6370000000 HC RX 637 (ALT 250 FOR IP)

## 2024-10-25 PROCEDURE — 97535 SELF CARE MNGMENT TRAINING: CPT

## 2024-10-25 PROCEDURE — 6370000000 HC RX 637 (ALT 250 FOR IP): Performed by: HOSPITALIST

## 2024-10-25 PROCEDURE — 2580000003 HC RX 258: Performed by: NURSE PRACTITIONER

## 2024-10-25 RX ORDER — LOPERAMIDE HYDROCHLORIDE 2 MG/1
4 CAPSULE ORAL 4 TIMES DAILY PRN
Status: DISCONTINUED | OUTPATIENT
Start: 2024-10-25 | End: 2024-10-30 | Stop reason: HOSPADM

## 2024-10-25 RX ADMIN — HYDRALAZINE HYDROCHLORIDE 100 MG: 50 TABLET ORAL at 14:22

## 2024-10-25 RX ADMIN — SODIUM CHLORIDE, PRESERVATIVE FREE 10 ML: 5 INJECTION INTRAVENOUS at 21:26

## 2024-10-25 RX ADMIN — ATORVASTATIN CALCIUM 80 MG: 40 TABLET, FILM COATED ORAL at 22:31

## 2024-10-25 RX ADMIN — CARVEDILOL 25 MG: 12.5 TABLET, FILM COATED ORAL at 18:01

## 2024-10-25 RX ADMIN — HYDRALAZINE HYDROCHLORIDE 100 MG: 50 TABLET ORAL at 22:31

## 2024-10-25 RX ADMIN — AMLODIPINE BESYLATE 10 MG: 5 TABLET ORAL at 09:19

## 2024-10-25 RX ADMIN — CARVEDILOL 25 MG: 12.5 TABLET, FILM COATED ORAL at 09:16

## 2024-10-25 RX ADMIN — HYDRALAZINE HYDROCHLORIDE 100 MG: 50 TABLET ORAL at 06:25

## 2024-10-25 RX ADMIN — LOPERAMIDE HYDROCHLORIDE 4 MG: 2 CAPSULE ORAL at 10:08

## 2024-10-25 RX ADMIN — LABETALOL HYDROCHLORIDE 10 MG: 5 INJECTION INTRAVENOUS at 18:05

## 2024-10-25 RX ADMIN — SODIUM CHLORIDE, PRESERVATIVE FREE 10 ML: 5 INJECTION INTRAVENOUS at 09:18

## 2024-10-26 LAB
ALBUMIN SERPL-MCNC: 2.2 G/DL (ref 3.5–5)
ANION GAP SERPL CALC-SCNC: 10 MMOL/L (ref 2–12)
BUN SERPL-MCNC: 38 MG/DL (ref 6–20)
BUN/CREAT SERPL: 11 (ref 12–20)
CALCIUM SERPL-MCNC: 8.6 MG/DL (ref 8.5–10.1)
CHLORIDE SERPL-SCNC: 108 MMOL/L (ref 97–108)
CO2 SERPL-SCNC: 23 MMOL/L (ref 21–32)
COMMENT:: NORMAL
CREAT SERPL-MCNC: 3.33 MG/DL (ref 0.7–1.3)
GLUCOSE SERPL-MCNC: 83 MG/DL (ref 65–100)
PHOSPHATE SERPL-MCNC: 4.7 MG/DL (ref 2.6–4.7)
POTASSIUM SERPL-SCNC: 3.7 MMOL/L (ref 3.5–5.1)
SODIUM SERPL-SCNC: 141 MMOL/L (ref 136–145)
SPECIMEN HOLD: NORMAL

## 2024-10-26 PROCEDURE — 36415 COLL VENOUS BLD VENIPUNCTURE: CPT

## 2024-10-26 PROCEDURE — 6370000000 HC RX 637 (ALT 250 FOR IP)

## 2024-10-26 PROCEDURE — 6370000000 HC RX 637 (ALT 250 FOR IP): Performed by: NURSE PRACTITIONER

## 2024-10-26 PROCEDURE — 2060000000 HC ICU INTERMEDIATE R&B

## 2024-10-26 PROCEDURE — 80069 RENAL FUNCTION PANEL: CPT

## 2024-10-26 PROCEDURE — 2580000003 HC RX 258: Performed by: NURSE PRACTITIONER

## 2024-10-26 PROCEDURE — 6370000000 HC RX 637 (ALT 250 FOR IP): Performed by: HOSPITALIST

## 2024-10-26 RX ORDER — DOXAZOSIN 2 MG/1
2 TABLET ORAL NIGHTLY
Status: DISCONTINUED | OUTPATIENT
Start: 2024-10-26 | End: 2024-10-30 | Stop reason: HOSPADM

## 2024-10-26 RX ADMIN — ATORVASTATIN CALCIUM 80 MG: 40 TABLET, FILM COATED ORAL at 21:12

## 2024-10-26 RX ADMIN — CARVEDILOL 25 MG: 12.5 TABLET, FILM COATED ORAL at 08:57

## 2024-10-26 RX ADMIN — SODIUM CHLORIDE, PRESERVATIVE FREE 10 ML: 5 INJECTION INTRAVENOUS at 09:01

## 2024-10-26 RX ADMIN — HYDRALAZINE HYDROCHLORIDE 100 MG: 50 TABLET ORAL at 21:12

## 2024-10-26 RX ADMIN — AMLODIPINE BESYLATE 10 MG: 5 TABLET ORAL at 08:54

## 2024-10-26 RX ADMIN — SODIUM CHLORIDE, PRESERVATIVE FREE 10 ML: 5 INJECTION INTRAVENOUS at 21:26

## 2024-10-26 RX ADMIN — HYDRALAZINE HYDROCHLORIDE 100 MG: 50 TABLET ORAL at 15:33

## 2024-10-26 RX ADMIN — SENNOSIDES AND DOCUSATE SODIUM 1 TABLET: 50; 8.6 TABLET ORAL at 08:57

## 2024-10-26 RX ADMIN — CARVEDILOL 25 MG: 12.5 TABLET, FILM COATED ORAL at 15:35

## 2024-10-26 RX ADMIN — DOXAZOSIN 2 MG: 2 TABLET ORAL at 21:12

## 2024-10-26 NOTE — CARE COORDINATION
Transition of Care Plan  RUR 11%    Disposition   IPR pending authorization from [facility]  for IPR--[facility]     Authorization started 10/23/24   Per on call Delma duarte [phone]  authorization still pending      Prior Level of Functioning: Independent   Disposition: IPR  If SNF or IPR: Date FOC offered: 10/23/24  Date FOC received: 10/23/24   Follow up appointments: PCP Neurology   DME needed: TBD  Transportation at discharge:  BLS   IM/IMM Medicare/[facility] letter given: NA  Is patient a  and connected with VA? No  Caregiver Contact:   Mother Carol Allison [phone]  Discharge Caregiver contacted prior to discharge? No  Care Conference needed? No   Barriers to discharge:  Auth - started 10/23 still pending 10/26/24      PM&R consulted to support insurance for IPR    CM to follow up on 10/28/24       CHRIS DRISCOLL

## 2024-10-27 LAB
ALBUMIN SERPL-MCNC: 2.1 G/DL (ref 3.5–5)
ANION GAP SERPL CALC-SCNC: 7 MMOL/L (ref 2–12)
BUN SERPL-MCNC: 39 MG/DL (ref 6–20)
BUN/CREAT SERPL: 12 (ref 12–20)
CALCIUM SERPL-MCNC: 8.1 MG/DL (ref 8.5–10.1)
CHLORIDE SERPL-SCNC: 109 MMOL/L (ref 97–108)
CO2 SERPL-SCNC: 24 MMOL/L (ref 21–32)
COMMENT:: NORMAL
CREAT SERPL-MCNC: 3.21 MG/DL (ref 0.7–1.3)
GLUCOSE SERPL-MCNC: 91 MG/DL (ref 65–100)
PHOSPHATE SERPL-MCNC: 4.2 MG/DL (ref 2.6–4.7)
POTASSIUM SERPL-SCNC: 3.3 MMOL/L (ref 3.5–5.1)
SODIUM SERPL-SCNC: 140 MMOL/L (ref 136–145)
SPECIMEN HOLD: NORMAL

## 2024-10-27 PROCEDURE — 6370000000 HC RX 637 (ALT 250 FOR IP): Performed by: NURSE PRACTITIONER

## 2024-10-27 PROCEDURE — 2060000000 HC ICU INTERMEDIATE R&B

## 2024-10-27 PROCEDURE — 80069 RENAL FUNCTION PANEL: CPT

## 2024-10-27 PROCEDURE — 94760 N-INVAS EAR/PLS OXIMETRY 1: CPT

## 2024-10-27 PROCEDURE — 2580000003 HC RX 258: Performed by: NURSE PRACTITIONER

## 2024-10-27 PROCEDURE — 6370000000 HC RX 637 (ALT 250 FOR IP)

## 2024-10-27 PROCEDURE — 6370000000 HC RX 637 (ALT 250 FOR IP): Performed by: HOSPITALIST

## 2024-10-27 RX ADMIN — HYDRALAZINE HYDROCHLORIDE 100 MG: 50 TABLET ORAL at 21:25

## 2024-10-27 RX ADMIN — HYDRALAZINE HYDROCHLORIDE 100 MG: 50 TABLET ORAL at 06:09

## 2024-10-27 RX ADMIN — CARVEDILOL 25 MG: 12.5 TABLET, FILM COATED ORAL at 09:27

## 2024-10-27 RX ADMIN — SODIUM CHLORIDE, PRESERVATIVE FREE 10 ML: 5 INJECTION INTRAVENOUS at 21:27

## 2024-10-27 RX ADMIN — SODIUM CHLORIDE, PRESERVATIVE FREE 10 ML: 5 INJECTION INTRAVENOUS at 09:28

## 2024-10-27 RX ADMIN — HYDRALAZINE HYDROCHLORIDE 100 MG: 50 TABLET ORAL at 13:07

## 2024-10-27 RX ADMIN — AMLODIPINE BESYLATE 10 MG: 5 TABLET ORAL at 09:28

## 2024-10-27 RX ADMIN — ATORVASTATIN CALCIUM 80 MG: 40 TABLET, FILM COATED ORAL at 21:25

## 2024-10-27 RX ADMIN — SENNOSIDES AND DOCUSATE SODIUM 1 TABLET: 50; 8.6 TABLET ORAL at 09:27

## 2024-10-27 RX ADMIN — DOXAZOSIN 2 MG: 2 TABLET ORAL at 21:25

## 2024-10-27 RX ADMIN — CARVEDILOL 25 MG: 12.5 TABLET, FILM COATED ORAL at 18:15

## 2024-10-27 ASSESSMENT — PAIN SCALES - GENERAL
PAINLEVEL_OUTOF10: 0

## 2024-10-28 LAB
ALBUMIN SERPL-MCNC: 2.1 G/DL (ref 3.5–5)
ANION GAP SERPL CALC-SCNC: 9 MMOL/L (ref 2–12)
BUN SERPL-MCNC: 38 MG/DL (ref 6–20)
BUN/CREAT SERPL: 12 (ref 12–20)
CALCIUM SERPL-MCNC: 8.4 MG/DL (ref 8.5–10.1)
CHLORIDE SERPL-SCNC: 110 MMOL/L (ref 97–108)
CO2 SERPL-SCNC: 22 MMOL/L (ref 21–32)
COMMENT:: NORMAL
CREAT SERPL-MCNC: 3.14 MG/DL (ref 0.7–1.3)
GLUCOSE SERPL-MCNC: 88 MG/DL (ref 65–100)
PHOSPHATE SERPL-MCNC: 4.5 MG/DL (ref 2.6–4.7)
POTASSIUM SERPL-SCNC: 3.6 MMOL/L (ref 3.5–5.1)
SODIUM SERPL-SCNC: 141 MMOL/L (ref 136–145)
SPECIMEN HOLD: NORMAL

## 2024-10-28 PROCEDURE — 6370000000 HC RX 637 (ALT 250 FOR IP): Performed by: HOSPITALIST

## 2024-10-28 PROCEDURE — 36415 COLL VENOUS BLD VENIPUNCTURE: CPT

## 2024-10-28 PROCEDURE — 2580000003 HC RX 258: Performed by: NURSE PRACTITIONER

## 2024-10-28 PROCEDURE — 80069 RENAL FUNCTION PANEL: CPT

## 2024-10-28 PROCEDURE — 6370000000 HC RX 637 (ALT 250 FOR IP): Performed by: NURSE PRACTITIONER

## 2024-10-28 PROCEDURE — 2060000000 HC ICU INTERMEDIATE R&B

## 2024-10-28 PROCEDURE — 97535 SELF CARE MNGMENT TRAINING: CPT

## 2024-10-28 PROCEDURE — 97530 THERAPEUTIC ACTIVITIES: CPT

## 2024-10-28 PROCEDURE — 6370000000 HC RX 637 (ALT 250 FOR IP)

## 2024-10-28 RX ORDER — CLONIDINE HYDROCHLORIDE 0.1 MG/1
0.1 TABLET ORAL EVERY 6 HOURS PRN
Status: DISCONTINUED | OUTPATIENT
Start: 2024-10-28 | End: 2024-10-28

## 2024-10-28 RX ORDER — CLONIDINE HYDROCHLORIDE 0.1 MG/1
0.1 TABLET ORAL EVERY 6 HOURS PRN
Status: DISCONTINUED | OUTPATIENT
Start: 2024-10-28 | End: 2024-10-30 | Stop reason: HOSPADM

## 2024-10-28 RX ADMIN — CARVEDILOL 25 MG: 12.5 TABLET, FILM COATED ORAL at 17:04

## 2024-10-28 RX ADMIN — AMLODIPINE BESYLATE 10 MG: 5 TABLET ORAL at 09:01

## 2024-10-28 RX ADMIN — DOXAZOSIN 2 MG: 2 TABLET ORAL at 21:27

## 2024-10-28 RX ADMIN — HYDRALAZINE HYDROCHLORIDE 100 MG: 50 TABLET ORAL at 21:27

## 2024-10-28 RX ADMIN — ATORVASTATIN CALCIUM 80 MG: 40 TABLET, FILM COATED ORAL at 21:27

## 2024-10-28 RX ADMIN — SODIUM CHLORIDE, PRESERVATIVE FREE 10 ML: 5 INJECTION INTRAVENOUS at 09:02

## 2024-10-28 RX ADMIN — HYDRALAZINE HYDROCHLORIDE 100 MG: 50 TABLET ORAL at 14:16

## 2024-10-28 RX ADMIN — CARVEDILOL 25 MG: 12.5 TABLET, FILM COATED ORAL at 09:02

## 2024-10-28 RX ADMIN — HYDRALAZINE HYDROCHLORIDE 100 MG: 50 TABLET ORAL at 06:52

## 2024-10-28 NOTE — CARE COORDINATION
Transition of Care Plan:    IPR - [facility]; pending insurance auth through [employer] to be started 10/28  - Pt had [employer] as primary on chart, Pt has a [employer] policy as primary  -PM&R following    Transport: S    RUR: 11%   Prior Level of Functioning: Independent   Disposition: IPR  If SNF or IPR: Date FOC offered: 10/23/24  Date FOC received: 10/23/24   Follow up appointments: PCP Neurology   DME needed: TBD  Transportation at discharge: Family vs BLS   IM/IMM Medicare/ letter given: NA  Is patient a  and connected with VA? No  Caregiver Contact: Mother Carol Callands [phone]  Discharge Caregiver contacted prior to discharge? No  Care Conference needed? No   Barriers to discharge:  Auth - started 10/23; Pt informed CM he does not have [employer], has [employer] commercial policy on 10/28. Copy provided to [facility] for auth to be started ASAP.    CM met with pt at bedside to provide update that insurance auth is still pending with [employer]. Pt informed CM that he no longer had [employer] primary - this was Pt ex-wife's insurance. He now has a [employer] plan - CM was provided with a copy of this card. [facility] to re-initiate auth ASAP; also sent to Pt Access for update on chart. Hard copy made and placed on chart.    REYMUNDO Morales (Ally).

## 2024-10-28 NOTE — DISCHARGE INSTRUCTIONS
Stroke Support Groups    Why Join a Stroke Support Group?  Joining a stroke support group offers emotional support, learning opportunities, and social interaction which are crucial for your recovery. In these groups, you can share your feelings and challenges in a safe environment, learn valuable tips on recovery and stroke prevention, and connect with others who understand your journey. Whether you choose an in-person or virtual group, it can significantly enhance your recovery process.    In-Person Option:  Group Name: [facility] Stroke Survivor Support Group  Location: [facility]  Details: Connect face-to-face with other stroke survivors. Share experiences and get support.  More Info: [facility] Stroke Survivor Support Group ([email or web address]/[email or web address]-[email or web address])    Virtual Option:  Group Name: Stroke Survivor and Caregiver Support Group Iris Bravo  Details: Join from home and meet other survivors and caregivers online.  More Info: Stroke Survivor and Caregiver Support Group - Leana (https://www.stroke.org/en/stroke-groups/stroke-[email or web address]-[email or web address]-group-leana)    We recommend choosing a group that fits your lifestyle to help you through your recovery.       Discharge Instructions       PATIENT ID: Stevon Callands  MRN: [id number]   YOB: 1980    DATE OF ADMISSION: 10/20/2024 12:52 PM    DATE OF DISCHARGE: 10/30/2024   PRIMARY CARE PROVIDER: Dequan Bear, APRN - CNP      ATTENDING PHYSICIAN: ELI KNIGHT MD   DISCHARGING PROVIDER: Eli Knight MD    To contact this individual call [phone] and ask the  to page.  If unavailable ask to be transferred the Adult Hospitalist Department.     CONSULTATIONS: IP CONSULT TO NEUROLOGY  IP CONSULT TO CASE MANAGEMENT  IP CONSULT TO STROKE COORDINATOR  IP CONSULT TO NEPHROLOGY  IP CONSULT TO PHYSICAL MEDICINE REHAB     PROCEDURES/SURGERIES: * No

## 2024-10-29 LAB
ALBUMIN SERPL-MCNC: 2.2 G/DL (ref 3.5–5)
ANION GAP SERPL CALC-SCNC: 9 MMOL/L (ref 2–12)
BASOPHILS # BLD: 0 K/UL (ref 0–0.1)
BASOPHILS NFR BLD: 1 % (ref 0–1)
BUN SERPL-MCNC: 36 MG/DL (ref 6–20)
BUN/CREAT SERPL: 12 (ref 12–20)
CALCIUM SERPL-MCNC: 8.8 MG/DL (ref 8.5–10.1)
CHLORIDE SERPL-SCNC: 110 MMOL/L (ref 97–108)
CO2 SERPL-SCNC: 21 MMOL/L (ref 21–32)
CREAT SERPL-MCNC: 3.12 MG/DL (ref 0.7–1.3)
DIFFERENTIAL METHOD BLD: ABNORMAL
EOSINOPHIL # BLD: 0 K/UL (ref 0–0.4)
EOSINOPHIL NFR BLD: 1 % (ref 0–7)
ERYTHROCYTE [DISTWIDTH] IN BLOOD BY AUTOMATED COUNT: 13.7 % (ref 11.5–14.5)
GLUCOSE SERPL-MCNC: 84 MG/DL (ref 65–100)
HCT VFR BLD AUTO: 37.4 % (ref 36.6–50.3)
HGB BLD-MCNC: 12.1 G/DL (ref 12.1–17)
IMM GRANULOCYTES # BLD AUTO: 0 K/UL (ref 0–0.04)
IMM GRANULOCYTES NFR BLD AUTO: 1 % (ref 0–0.5)
LYMPHOCYTES # BLD: 1.5 K/UL (ref 0.8–3.5)
LYMPHOCYTES NFR BLD: 26 % (ref 12–49)
MCH RBC QN AUTO: 25.8 PG (ref 26–34)
MCHC RBC AUTO-ENTMCNC: 32.4 G/DL (ref 30–36.5)
MCV RBC AUTO: 79.7 FL (ref 80–99)
MONOCYTES # BLD: 0.7 K/UL (ref 0–1)
MONOCYTES NFR BLD: 12 % (ref 5–13)
NEUTS SEG # BLD: 3.6 K/UL (ref 1.8–8)
NEUTS SEG NFR BLD: 59 % (ref 32–75)
NRBC # BLD: 0 K/UL (ref 0–0.01)
NRBC BLD-RTO: 0 PER 100 WBC
PHOSPHATE SERPL-MCNC: 4.7 MG/DL (ref 2.6–4.7)
PLATELET # BLD AUTO: 343 K/UL (ref 150–400)
PMV BLD AUTO: 10.9 FL (ref 8.9–12.9)
POTASSIUM SERPL-SCNC: 3.5 MMOL/L (ref 3.5–5.1)
RBC # BLD AUTO: 4.69 M/UL (ref 4.1–5.7)
SODIUM SERPL-SCNC: 140 MMOL/L (ref 136–145)
WBC # BLD AUTO: 6 K/UL (ref 4.1–11.1)

## 2024-10-29 PROCEDURE — 85025 COMPLETE CBC W/AUTO DIFF WBC: CPT

## 2024-10-29 PROCEDURE — 6370000000 HC RX 637 (ALT 250 FOR IP): Performed by: NURSE PRACTITIONER

## 2024-10-29 PROCEDURE — 2060000000 HC ICU INTERMEDIATE R&B

## 2024-10-29 PROCEDURE — 80069 RENAL FUNCTION PANEL: CPT

## 2024-10-29 PROCEDURE — 6370000000 HC RX 637 (ALT 250 FOR IP): Performed by: HOSPITALIST

## 2024-10-29 PROCEDURE — 6370000000 HC RX 637 (ALT 250 FOR IP)

## 2024-10-29 RX ADMIN — HYDRALAZINE HYDROCHLORIDE 100 MG: 50 TABLET ORAL at 20:55

## 2024-10-29 RX ADMIN — HYDRALAZINE HYDROCHLORIDE 100 MG: 50 TABLET ORAL at 06:54

## 2024-10-29 RX ADMIN — CARVEDILOL 25 MG: 12.5 TABLET, FILM COATED ORAL at 06:54

## 2024-10-29 RX ADMIN — AMLODIPINE BESYLATE 10 MG: 5 TABLET ORAL at 09:05

## 2024-10-29 RX ADMIN — LOPERAMIDE HYDROCHLORIDE 4 MG: 2 CAPSULE ORAL at 09:49

## 2024-10-29 RX ADMIN — ATORVASTATIN CALCIUM 80 MG: 40 TABLET, FILM COATED ORAL at 20:55

## 2024-10-29 RX ADMIN — CARVEDILOL 25 MG: 12.5 TABLET, FILM COATED ORAL at 17:44

## 2024-10-29 RX ADMIN — DOXAZOSIN 2 MG: 2 TABLET ORAL at 20:55

## 2024-10-29 RX ADMIN — HYDRALAZINE HYDROCHLORIDE 100 MG: 50 TABLET ORAL at 13:37

## 2024-10-29 SDOH — SOCIAL STABILITY: SOCIAL INSECURITY
WITHIN THE LAST YEAR, HAVE YOU BEEN HUMILIATED OR EMOTIONALLY ABUSED IN OTHER WAYS BY YOUR PARTNER OR EX-PARTNER?: PATIENT UNABLE TO ANSWER

## 2024-10-29 SDOH — SOCIAL STABILITY: SOCIAL NETWORK: ARE YOU MARRIED, WIDOWED, DIVORCED, SEPARATED, NEVER MARRIED, OR LIVING WITH A PARTNER?: PATIENT UNABLE TO ANSWER

## 2024-10-29 SDOH — ECONOMIC STABILITY: TRANSPORTATION INSECURITY
IN THE PAST 12 MONTHS, HAS THE LACK OF TRANSPORTATION KEPT YOU FROM MEDICAL APPOINTMENTS OR FROM GETTING MEDICATIONS?: PATIENT UNABLE TO ANSWER

## 2024-10-29 SDOH — SOCIAL STABILITY: SOCIAL NETWORK: HOW OFTEN DO YOU GET TOGETHER WITH FRIENDS OR RELATIVES?: PATIENT UNABLE TO ANSWER

## 2024-10-29 SDOH — HEALTH STABILITY: MENTAL HEALTH: HOW MANY STANDARD DRINKS CONTAINING ALCOHOL DO YOU HAVE ON A TYPICAL DAY?: PATIENT UNABLE TO ANSWER

## 2024-10-29 SDOH — SOCIAL STABILITY: SOCIAL NETWORK: HOW OFTEN DO YOU ATTENT MEETINGS OF THE CLUB OR ORGANIZATION YOU BELONG TO?: PATIENT UNABLE TO ANSWER

## 2024-10-29 SDOH — SOCIAL STABILITY: SOCIAL NETWORK: HOW OFTEN DO YOU ATTEND CHURCH OR RELIGIOUS SERVICES?: PATIENT UNABLE TO ANSWER

## 2024-10-29 SDOH — ECONOMIC STABILITY: TRANSPORTATION INSECURITY
IN THE PAST 12 MONTHS, HAS LACK OF TRANSPORTATION KEPT YOU FROM MEETINGS, WORK, OR FROM GETTING THINGS NEEDED FOR DAILY LIVING?: PATIENT UNABLE TO ANSWER

## 2024-10-29 SDOH — HEALTH STABILITY: PHYSICAL HEALTH: ON AVERAGE, HOW MANY MINUTES DO YOU ENGAGE IN EXERCISE AT THIS LEVEL?: PATIENT UNABLE TO ANSWER

## 2024-10-29 SDOH — ECONOMIC STABILITY: FOOD INSECURITY: WITHIN THE PAST 12 MONTHS, YOU WORRIED THAT YOUR FOOD WOULD RUN OUT BEFORE YOU GOT MONEY TO BUY MORE.: NEVER TRUE

## 2024-10-29 SDOH — HEALTH STABILITY: MENTAL HEALTH
STRESS IS WHEN SOMEONE FEELS TENSE, NERVOUS, ANXIOUS, OR CAN'T SLEEP AT NIGHT BECAUSE THEIR MIND IS TROUBLED. HOW STRESSED ARE YOU?: PATIENT UNABLE TO ANSWER

## 2024-10-29 SDOH — HEALTH STABILITY: PHYSICAL HEALTH
ON AVERAGE, HOW MANY DAYS PER WEEK DO YOU ENGAGE IN MODERATE TO STRENUOUS EXERCISE (LIKE A BRISK WALK)?: PATIENT UNABLE TO ANSWER

## 2024-10-29 SDOH — HEALTH STABILITY: MENTAL HEALTH: HOW OFTEN DO YOU HAVE A DRINK CONTAINING ALCOHOL?: PATIENT UNABLE TO ANSWER

## 2024-10-29 SDOH — SOCIAL STABILITY: SOCIAL NETWORK: IN A TYPICAL WEEK, HOW MANY TIMES DO YOU TALK ON THE PHONE WITH FAMILY, FRIENDS, OR NEIGHBORS?: PATIENT UNABLE TO ANSWER

## 2024-10-29 SDOH — ECONOMIC STABILITY: FOOD INSECURITY: WITHIN THE PAST 12 MONTHS, THE FOOD YOU BOUGHT JUST DIDN'T LAST AND YOU DIDN'T HAVE MONEY TO GET MORE.: NEVER TRUE

## 2024-10-29 SDOH — SOCIAL STABILITY: SOCIAL NETWORK
DO YOU BELONG TO ANY CLUBS OR ORGANIZATIONS SUCH AS CHURCH GROUPS UNIONS, FRATERNAL OR ATHLETIC GROUPS, OR SCHOOL GROUPS?: PATIENT UNABLE TO ANSWER

## 2024-10-29 SDOH — ECONOMIC STABILITY: INCOME INSECURITY: HOW HARD IS IT FOR YOU TO PAY FOR THE VERY BASICS LIKE FOOD, HOUSING, MEDICAL CARE, AND HEATING?: NOT HARD AT ALL

## 2024-10-29 SDOH — SOCIAL STABILITY: SOCIAL INSECURITY
WITHIN THE LAST YEAR, HAVE TO BEEN RAPED OR FORCED TO HAVE ANY KIND OF SEXUAL ACTIVITY BY YOUR PARTNER OR EX-PARTNER?: PATIENT UNABLE TO ANSWER

## 2024-10-29 SDOH — ECONOMIC STABILITY: INCOME INSECURITY
IN THE LAST 12 MONTHS, WAS THERE A TIME WHEN YOU WERE NOT ABLE TO PAY THE MORTGAGE OR RENT ON TIME?: PATIENT UNABLE TO ANSWER

## 2024-10-29 SDOH — SOCIAL STABILITY: SOCIAL INSECURITY: WITHIN THE LAST YEAR, HAVE YOU BEEN AFRAID OF YOUR PARTNER OR EX-PARTNER?: PATIENT UNABLE TO ANSWER

## 2024-10-29 SDOH — SOCIAL STABILITY: SOCIAL INSECURITY
WITHIN THE LAST YEAR, HAVE YOU BEEN KICKED, HIT, SLAPPED, OR OTHERWISE PHYSICALLY HURT BY YOUR PARTNER OR EX-PARTNER?: PATIENT UNABLE TO ANSWER

## 2024-10-29 ASSESSMENT — PAIN SCALES - GENERAL
PAINLEVEL_OUTOF10: 0

## 2024-10-29 NOTE — CARE COORDINATION
Transition of Care Plan:    IPR - [facility]; auth received/pending bed availability - likely 10/30    Transport: BLS    RUR: 11%   Prior Level of Functioning: Independent   Disposition: IPR  If SNF or IPR: Date FOC offered: 10/23/24  Date FOC received: 10/23/24   Follow up appointments: PCP Neurology   DME needed: TBD  Transportation at discharge: Family vs BLS   IM/IMM Medicare/ letter given: NA  Is patient a [place] and connected with VA? No  Caregiver Contact: Mother Carol Callands [phone]  Discharge Caregiver contacted prior to discharge? No  Care Conference needed? No   Barriers to discharge:  bed availability     CM provided update to Pt at bedside - auth is pending with [facility], started 10/28.    11:20am: Pt has received insurance auth for IPR at [facility]. Likely bed available tomorrow 10/30.    REYMUNDO Morales (Ally).

## 2024-10-29 NOTE — NURSE NAVIGATOR
Neuroscience Care Navigation Note    Patient Overview     The patient is a 44 y.o. 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 He was started on a nicardipine drip prior to transfer. Neurology was consulted, and hospital medicine was consulted on 10/23 for transfer of care. The patient is stable and is slated to be discharged to acute rehab soon.    Past Medical History:       Diagnosis Date    Hypertension        Past Surgical History: No past surgical history on file.    Assessment     Diagnosis Understanding: Nurse Navigator unable to assess due to the patient resting.    Medication Adherence: Nurse Navigator unable to assess due to the patient resting.    Mobility/Functional Status: The patient is able to ambulate with moderate assistance.. The patient is at risk for falls due to motor impairments (e.g., balance)    Pain Management: The patient appears to not be in any pain at this time.    Emotional Well-being: The patient is unable to report emotional well-being due to resting.     Support System: The patient's support system includes: spouse/Partner and parent/s    Discharge Readiness: The patient has plans to be discharged to Acute Rehab. Nurse Navigator unable to assess discharge readiness due to the patient resting.    Social Determinants of Health     Tobacco Use: Low Risk  (6/30/2022)    Received from [facility]    Patient History     Smoking Tobacco Use: Never     Smokeless Tobacco Use: Never     Passive Exposure: Not on file   Alcohol Use: Patient Unable To Answer (10/29/2024)    AUDIT-C     Frequency of Alcohol Consumption: Patient unable to answer     Average Number of Drinks: Patient unable to answer

## 2024-10-30 VITALS
SYSTOLIC BLOOD PRESSURE: 126 MMHG | RESPIRATION RATE: 14 BRPM | HEIGHT: 67 IN | WEIGHT: 247.7 LBS | DIASTOLIC BLOOD PRESSURE: 76 MMHG | BODY MASS INDEX: 38.88 KG/M2 | TEMPERATURE: 97.9 F | HEART RATE: 60 BPM | OXYGEN SATURATION: 97 %

## 2024-10-30 LAB
ALBUMIN SERPL-MCNC: 2.1 G/DL (ref 3.5–5)
ANION GAP SERPL CALC-SCNC: 8 MMOL/L (ref 2–12)
BASOPHILS # BLD: 0 K/UL (ref 0–0.1)
BASOPHILS NFR BLD: 0 % (ref 0–1)
BUN SERPL-MCNC: 37 MG/DL (ref 6–20)
BUN/CREAT SERPL: 12 (ref 12–20)
CALCIUM SERPL-MCNC: 8.4 MG/DL (ref 8.5–10.1)
CHLORIDE SERPL-SCNC: 110 MMOL/L (ref 97–108)
CO2 SERPL-SCNC: 23 MMOL/L (ref 21–32)
CREAT SERPL-MCNC: 3.15 MG/DL (ref 0.7–1.3)
DIFFERENTIAL METHOD BLD: ABNORMAL
EOSINOPHIL # BLD: 0.1 K/UL (ref 0–0.4)
EOSINOPHIL NFR BLD: 1 % (ref 0–7)
ERYTHROCYTE [DISTWIDTH] IN BLOOD BY AUTOMATED COUNT: 13.6 % (ref 11.5–14.5)
GLUCOSE SERPL-MCNC: 89 MG/DL (ref 65–100)
HCT VFR BLD AUTO: 36.1 % (ref 36.6–50.3)
HGB BLD-MCNC: 11.6 G/DL (ref 12.1–17)
IMM GRANULOCYTES # BLD AUTO: 0 K/UL (ref 0–0.04)
IMM GRANULOCYTES NFR BLD AUTO: 0 % (ref 0–0.5)
LYMPHOCYTES # BLD: 1.6 K/UL (ref 0.8–3.5)
LYMPHOCYTES NFR BLD: 26 % (ref 12–49)
MCH RBC QN AUTO: 25.8 PG (ref 26–34)
MCHC RBC AUTO-ENTMCNC: 32.1 G/DL (ref 30–36.5)
MCV RBC AUTO: 80.2 FL (ref 80–99)
MONOCYTES # BLD: 0.8 K/UL (ref 0–1)
MONOCYTES NFR BLD: 12 % (ref 5–13)
NEUTS SEG # BLD: 3.7 K/UL (ref 1.8–8)
NEUTS SEG NFR BLD: 61 % (ref 32–75)
NRBC # BLD: 0 K/UL (ref 0–0.01)
NRBC BLD-RTO: 0 PER 100 WBC
PHOSPHATE SERPL-MCNC: 3.8 MG/DL (ref 2.6–4.7)
PLATELET # BLD AUTO: 318 K/UL (ref 150–400)
PMV BLD AUTO: 10.8 FL (ref 8.9–12.9)
POTASSIUM SERPL-SCNC: 3.5 MMOL/L (ref 3.5–5.1)
RBC # BLD AUTO: 4.5 M/UL (ref 4.1–5.7)
SODIUM SERPL-SCNC: 141 MMOL/L (ref 136–145)
WBC # BLD AUTO: 6.1 K/UL (ref 4.1–11.1)

## 2024-10-30 PROCEDURE — 6370000000 HC RX 637 (ALT 250 FOR IP)

## 2024-10-30 PROCEDURE — 80069 RENAL FUNCTION PANEL: CPT

## 2024-10-30 PROCEDURE — 85025 COMPLETE CBC W/AUTO DIFF WBC: CPT

## 2024-10-30 PROCEDURE — 6370000000 HC RX 637 (ALT 250 FOR IP): Performed by: NURSE PRACTITIONER

## 2024-10-30 RX ORDER — HYDRALAZINE HYDROCHLORIDE 100 MG/1
100 TABLET, FILM COATED ORAL EVERY 8 HOURS SCHEDULED
Qty: 90 TABLET | Refills: 3 | Status: SHIPPED
Start: 2024-10-30

## 2024-10-30 RX ORDER — ATORVASTATIN CALCIUM 80 MG/1
80 TABLET, FILM COATED ORAL NIGHTLY
Qty: 30 TABLET | Refills: 3 | Status: SHIPPED
Start: 2024-10-30

## 2024-10-30 RX ORDER — POLYETHYLENE GLYCOL 3350 17 G/17G
17 POWDER, FOR SOLUTION ORAL DAILY PRN
Qty: 527 G | Refills: 1 | Status: SHIPPED
Start: 2024-10-30 | End: 2024-12-31

## 2024-10-30 RX ADMIN — HYDRALAZINE HYDROCHLORIDE 100 MG: 50 TABLET ORAL at 14:43

## 2024-10-30 RX ADMIN — HYDRALAZINE HYDROCHLORIDE 100 MG: 50 TABLET ORAL at 06:23

## 2024-10-30 RX ADMIN — CARVEDILOL 25 MG: 12.5 TABLET, FILM COATED ORAL at 06:23

## 2024-10-30 RX ADMIN — AMLODIPINE BESYLATE 10 MG: 5 TABLET ORAL at 09:26

## 2024-10-30 SDOH — ECONOMIC STABILITY: TRANSPORTATION INSECURITY
IN THE PAST 12 MONTHS, HAS LACK OF TRANSPORTATION KEPT YOU FROM MEETINGS, WORK, OR FROM GETTING THINGS NEEDED FOR DAILY LIVING?: NO

## 2024-10-30 SDOH — SOCIAL STABILITY: SOCIAL INSECURITY
WITHIN THE LAST YEAR, HAVE YOU BEEN KICKED, HIT, SLAPPED, OR OTHERWISE PHYSICALLY HURT BY YOUR PARTNER OR EX-PARTNER?: NO

## 2024-10-30 SDOH — SOCIAL STABILITY: SOCIAL NETWORK: IN A TYPICAL WEEK, HOW MANY TIMES DO YOU TALK ON THE PHONE WITH FAMILY, FRIENDS, OR NEIGHBORS?: THREE TIMES A WEEK

## 2024-10-30 SDOH — HEALTH STABILITY: PHYSICAL HEALTH: ON AVERAGE, HOW MANY MINUTES DO YOU ENGAGE IN EXERCISE AT THIS LEVEL?: 0 MIN

## 2024-10-30 SDOH — SOCIAL STABILITY: SOCIAL INSECURITY: WITHIN THE LAST YEAR, HAVE YOU BEEN HUMILIATED OR EMOTIONALLY ABUSED IN OTHER WAYS BY YOUR PARTNER OR EX-PARTNER?: NO

## 2024-10-30 SDOH — HEALTH STABILITY: MENTAL HEALTH
STRESS IS WHEN SOMEONE FEELS TENSE, NERVOUS, ANXIOUS, OR CAN'T SLEEP AT NIGHT BECAUSE THEIR MIND IS TROUBLED. HOW STRESSED ARE YOU?: NOT AT ALL

## 2024-10-30 SDOH — ECONOMIC STABILITY: FOOD INSECURITY: WITHIN THE PAST 12 MONTHS, YOU WORRIED THAT YOUR FOOD WOULD RUN OUT BEFORE YOU GOT MONEY TO BUY MORE.: NEVER TRUE

## 2024-10-30 SDOH — ECONOMIC STABILITY: INCOME INSECURITY: IN THE LAST 12 MONTHS, WAS THERE A TIME WHEN YOU WERE NOT ABLE TO PAY THE MORTGAGE OR RENT ON TIME?: NO

## 2024-10-30 SDOH — SOCIAL STABILITY: SOCIAL INSECURITY: WITHIN THE LAST YEAR, HAVE YOU BEEN AFRAID OF YOUR PARTNER OR EX-PARTNER?: NO

## 2024-10-30 SDOH — ECONOMIC STABILITY: FOOD INSECURITY: WITHIN THE PAST 12 MONTHS, THE FOOD YOU BOUGHT JUST DIDN'T LAST AND YOU DIDN'T HAVE MONEY TO GET MORE.: NEVER TRUE

## 2024-10-30 SDOH — SOCIAL STABILITY: SOCIAL NETWORK
DO YOU BELONG TO ANY CLUBS OR ORGANIZATIONS SUCH AS CHURCH GROUPS UNIONS, FRATERNAL OR ATHLETIC GROUPS, OR SCHOOL GROUPS?: YES

## 2024-10-30 SDOH — SOCIAL STABILITY: SOCIAL NETWORK: ARE YOU MARRIED, WIDOWED, DIVORCED, SEPARATED, NEVER MARRIED, OR LIVING WITH A PARTNER?: DIVORCED

## 2024-10-30 SDOH — SOCIAL STABILITY: SOCIAL NETWORK: HOW OFTEN DO YOU ATTENT MEETINGS OF THE CLUB OR ORGANIZATION YOU BELONG TO?: 1 TO 4 TIMES PER YEAR

## 2024-10-30 SDOH — HEALTH STABILITY: MENTAL HEALTH: HOW OFTEN DO YOU HAVE A DRINK CONTAINING ALCOHOL?: NEVER

## 2024-10-30 SDOH — SOCIAL STABILITY: SOCIAL INSECURITY
WITHIN THE LAST YEAR, HAVE TO BEEN RAPED OR FORCED TO HAVE ANY KIND OF SEXUAL ACTIVITY BY YOUR PARTNER OR EX-PARTNER?: NO

## 2024-10-30 SDOH — ECONOMIC STABILITY: TRANSPORTATION INSECURITY
IN THE PAST 12 MONTHS, HAS THE LACK OF TRANSPORTATION KEPT YOU FROM MEDICAL APPOINTMENTS OR FROM GETTING MEDICATIONS?: NO

## 2024-10-30 SDOH — SOCIAL STABILITY: SOCIAL NETWORK: HOW OFTEN DO YOU GET TOGETHER WITH FRIENDS OR RELATIVES?: THREE TIMES A WEEK

## 2024-10-30 SDOH — HEALTH STABILITY: PHYSICAL HEALTH: ON AVERAGE, HOW MANY DAYS PER WEEK DO YOU ENGAGE IN MODERATE TO STRENUOUS EXERCISE (LIKE A BRISK WALK)?: 0 DAYS

## 2024-10-30 SDOH — ECONOMIC STABILITY: INCOME INSECURITY: HOW HARD IS IT FOR YOU TO PAY FOR THE VERY BASICS LIKE FOOD, HOUSING, MEDICAL CARE, AND HEATING?: NOT HARD AT ALL

## 2024-10-30 SDOH — SOCIAL STABILITY: SOCIAL NETWORK: HOW OFTEN DO YOU ATTEND CHURCH OR RELIGIOUS SERVICES?: 1 TO 4 TIMES PER YEAR

## 2024-10-30 SDOH — HEALTH STABILITY: MENTAL HEALTH: HOW MANY STANDARD DRINKS CONTAINING ALCOHOL DO YOU HAVE ON A TYPICAL DAY?: PATIENT DOES NOT DRINK

## 2024-10-30 NOTE — PLAN OF CARE
Problem: Discharge Planning  Goal: Discharge to home or other facility with appropriate resources  10/24/2024 1445 by Pratibha Varma RN  Outcome: Progressing  10/24/2024 1444 by Pratibha Varma RN  Outcome: Progressing  10/24/2024 0110 by Jennifer Marcum RN  Outcome: Progressing  Flowsheets (Taken 10/23/2024 2000)  Discharge to home or other facility with appropriate resources: Identify barriers to discharge with patient and caregiver     Problem: Pain  Goal: Verbalizes/displays adequate comfort level or baseline comfort level  10/24/2024 1445 by Pratibha Varma RN  Outcome: Progressing  10/24/2024 1444 by Pratibha Varma RN  Outcome: Progressing  10/24/2024 0110 by Jennifer Marcum RN  Outcome: Progressing     Problem: Safety - Adult  Goal: Free from fall injury  10/24/2024 1445 by Pratibha Varma RN  Outcome: Progressing  10/24/2024 1444 by Pratibha Varma RN  Outcome: Progressing  10/24/2024 0110 by Jennifer Marcum RN  Outcome: Progressing       Problem: Skin/Tissue Integrity  Goal: Absence of new skin breakdown  Description: 1.  Monitor for areas of redness and/or skin breakdown  2.  Assess vascular access sites hourly  3.  Every 4-6 hours minimum:  Change oxygen saturation probe site  4.  Every 4-6 hours:  If on nasal continuous positive airway pressure, respiratory therapy assess nares and determine need for appliance change or resting period.  Outcome: Progressing     
  Problem: Discharge Planning  Goal: Discharge to home or other facility with appropriate resources  10/24/2024 2353 by Jennifer Marcum RN  Outcome: Progressing  10/24/2024 1445 by Pratibha Varma RN  Outcome: Progressing  10/24/2024 1444 by Pratibha Varma RN  Outcome: Progressing  Flowsheets (Taken 10/24/2024 0800)  Discharge to home or other facility with appropriate resources: Identify barriers to discharge with patient and caregiver     Problem: Pain  Goal: Verbalizes/displays adequate comfort level or baseline comfort level  10/24/2024 2353 by Jennifer Marcum RN  Outcome: Progressing  10/24/2024 1445 by Pratibha Varma RN  Outcome: Progressing  10/24/2024 1444 by Pratibha Varma RN  Outcome: Progressing     Problem: Safety - Adult  Goal: Free from fall injury  10/24/2024 2353 by Jennifer Marcum RN  Outcome: Progressing  10/24/2024 1445 by Pratibha Varma RN  Outcome: Progressing  10/24/2024 1444 by Pratibha Varma RN  Outcome: Progressing  Flowsheets (Taken 10/24/2024 0800)  Free From Fall Injury: Instruct family/caregiver on patient safety     Problem: Physical Therapy - Adult  Goal: By Discharge: Performs mobility at highest level of function for planned discharge setting.  See evaluation for individualized goals.  Description: FUNCTIONAL STATUS PRIOR TO ADMISSION: Patient was independent and active without use of DME.    HOME SUPPORT PRIOR TO ADMISSION: The patient lived alone with family to provide assistance, if needed.    Physical Therapy Goals  Initiated 10/21/2024  1.  Patient will move from supine to sit and sit to supine in bed with independence within 7 day(s).    2.  Patient will perform sit to stand with independence within 7 day(s).  3.  Patient will transfer from bed to chair and chair to bed with modified independence using the least restrictive device within 7 day(s).  4.  Patient will ambulate with modified independence for 150 feet with the least restrictive device within 7 day(s).   5.  
  Problem: Discharge Planning  Goal: Discharge to home or other facility with appropriate resources  10/25/2024 1146 by Talya Hmamond RN  Outcome: Progressing  Flowsheets (Taken 10/25/2024 0800)  Discharge to home or other facility with appropriate resources:   Identify barriers to discharge with patient and caregiver   Arrange for needed discharge resources and transportation as appropriate   Identify discharge learning needs (meds, wound care, etc)  10/24/2024 2353 by Jennifer Marcum RN  Outcome: Progressing     Problem: Pain  Goal: Verbalizes/displays adequate comfort level or baseline comfort level  10/25/2024 1146 by Talya Hammond RN  Outcome: Progressing  10/24/2024 2353 by Jennifer Marcum RN  Outcome: Progressing     Problem: Safety - Adult  Goal: Free from fall injury  10/25/2024 1146 by Talya Hammond RN  Outcome: Progressing  Flowsheets (Taken 10/25/2024 0800)  Free From Fall Injury: Instruct family/caregiver on patient safety  10/24/2024 2353 by Jennifer Marcum RN  Outcome: Progressing     Problem: Skin/Tissue Integrity  Goal: Absence of new skin breakdown  Description: 1.  Monitor for areas of redness and/or skin breakdown  2.  Assess vascular access sites hourly  3.  Every 4-6 hours minimum:  Change oxygen saturation probe site  4.  Every 4-6 hours:  If on nasal continuous positive airway pressure, respiratory therapy assess nares and determine need for appliance change or resting period.  10/25/2024 1146 by Talya Hammond RN  Outcome: Progressing  10/24/2024 2353 by Jennifer Marcum RN  Outcome: Progressing     
  Problem: Discharge Planning  Goal: Discharge to home or other facility with appropriate resources  10/28/2024 1144 by Grisel Ravi RN  Outcome: Progressing  10/28/2024 1144 by Grisel Ravi RN  Outcome: Progressing  Flowsheets (Taken 10/28/2024 0800)  Discharge to home or other facility with appropriate resources: Identify barriers to discharge with patient and caregiver     Problem: Pain  Goal: Verbalizes/displays adequate comfort level or baseline comfort level  10/28/2024 1144 by Grisel Ravi RN  Outcome: Progressing  10/28/2024 1144 by Grisel Ravi RN  Outcome: Progressing     Problem: Safety - Adult  Goal: Free from fall injury  10/28/2024 1144 by Grisel Ravi RN  Outcome: Progressing  10/28/2024 1144 by Grisel Ravi RN  Outcome: Progressing  Flowsheets (Taken 10/28/2024 0800)  Free From Fall Injury: Instruct family/caregiver on patient safety     Problem: Skin/Tissue Integrity  Goal: Absence of new skin breakdown  Description: 1.  Monitor for areas of redness and/or skin breakdown  2.  Assess vascular access sites hourly  3.  Every 4-6 hours minimum:  Change oxygen saturation probe site  4.  Every 4-6 hours:  If on nasal continuous positive airway pressure, respiratory therapy assess nares and determine need for appliance change or resting period.  10/28/2024 1144 by Grisel Ravi RN  Outcome: Progressing  10/28/2024 1144 by Grisel Ravi RN  Outcome: Progressing     Problem: Chronic Conditions and Co-morbidities  Goal: Patient's chronic conditions and co-morbidity symptoms are monitored and maintained or improved  Outcome: Progressing     
  Problem: Discharge Planning  Goal: Discharge to home or other facility with appropriate resources  10/29/2024 0037 by Earline Crocker, RN  Outcome: Progressing    Problem: Pain  Goal: Verbalizes/displays adequate comfort level or baseline comfort level  10/29/2024 0037 by Earline Crocker, RN  Outcome: Progressing    Problem: Safety - Adult  Goal: Free from fall injury  10/29/2024 0037 by Earline Crocker, RN  Outcome: Progressing    Problem: Skin/Tissue Integrity  Goal: Absence of new skin breakdown  Description: 1.  Monitor for areas of redness and/or skin breakdown  2.  Assess vascular access sites hourly  3.  Every 4-6 hours minimum:  Change oxygen saturation probe site  4.  Every 4-6 hours:  If on nasal continuous positive airway pressure, respiratory therapy assess nares and determine need for appliance change or resting period.  10/29/2024 0037 by Earline Crocker, RN  Outcome: Progressing    Problem: Chronic Conditions and Co-morbidities  Goal: Patient's chronic conditions and co-morbidity symptoms are monitored and maintained or improved  10/29/2024 0037 by Earline Crocker, RN  Outcome: Progressing       
  Problem: Discharge Planning  Goal: Discharge to home or other facility with appropriate resources  10/29/2024 1110 by Dalila Nobles  Outcome: Progressing  Flowsheets (Taken 10/29/2024 0800)  Discharge to home or other facility with appropriate resources:   Identify barriers to discharge with patient and caregiver   Arrange for needed discharge resources and transportation as appropriate   Identify discharge learning needs (meds, wound care, etc)   Arrange for interpreters to assist at discharge as needed   Refer to discharge planning if patient needs post-hospital services based on physician order or complex needs related to functional status, cognitive ability or social support system  10/29/2024 0037 by Earline Crocker, RN  Outcome: Progressing     Problem: Pain  Goal: Verbalizes/displays adequate comfort level or baseline comfort level  10/29/2024 1110 by Dalila Nobles  Outcome: Progressing  10/29/2024 0037 by Earline Crocker, RN  Outcome: Progressing     Problem: Safety - Adult  Goal: Free from fall injury  10/29/2024 1110 by Dalila Nobles  Outcome: Progressing  Flowsheets (Taken 10/29/2024 0800)  Free From Fall Injury: Instruct family/caregiver on patient safety  10/29/2024 0037 by Earline Crocker, RN  Outcome: Progressing     Problem: Skin/Tissue Integrity  Goal: Absence of new skin breakdown  Description: 1.  Monitor for areas of redness and/or skin breakdown  2.  Assess vascular access sites hourly  3.  Every 4-6 hours minimum:  Change oxygen saturation probe site  4.  Every 4-6 hours:  If on nasal continuous positive airway pressure, respiratory therapy assess nares and determine need for appliance change or resting period.  10/29/2024 1110 by Dalila Nobles  Outcome: Progressing  10/29/2024 0037 by Earline Crocker, RN  Outcome: Progressing     Problem: Chronic Conditions and Co-morbidities  Goal: Patient's chronic conditions and co-morbidity symptoms are monitored and maintained or 
  Problem: Discharge Planning  Goal: Discharge to home or other facility with appropriate resources  10/30/2024 1333 by Linh Luna RN  Outcome: Progressing  Flowsheets (Taken 10/30/2024 0800)  Discharge to home or other facility with appropriate resources:   Identify barriers to discharge with patient and caregiver   Arrange for needed discharge resources and transportation as appropriate   Identify discharge learning needs (meds, wound care, etc)   Arrange for interpreters to assist at discharge as needed   Refer to discharge planning if patient needs post-hospital services based on physician order or complex needs related to functional status, cognitive ability or social support system  10/30/2024 0118 by Earline Crocker, RN  Outcome: Progressing     Problem: Pain  Goal: Verbalizes/displays adequate comfort level or baseline comfort level  10/30/2024 1333 by Linh Luna RN  Outcome: Progressing  10/30/2024 0118 by Earline Crocker, RN  Outcome: Progressing     Problem: Safety - Adult  Goal: Free from fall injury  10/30/2024 1333 by Linh Luna RN  Outcome: Progressing  Flowsheets (Taken 10/30/2024 0800)  Free From Fall Injury:   Instruct family/caregiver on patient safety   Based on caregiver fall risk screen, instruct family/caregiver to ask for assistance with transferring infant if caregiver noted to have fall risk factors  10/30/2024 0118 by Earline Crocker, RN  Outcome: Progressing     Problem: Skin/Tissue Integrity  Goal: Absence of new skin breakdown  Description: 1.  Monitor for areas of redness and/or skin breakdown  2.  Assess vascular access sites hourly  3.  Every 4-6 hours minimum:  Change oxygen saturation probe site  4.  Every 4-6 hours:  If on nasal continuous positive airway pressure, respiratory therapy assess nares and determine need for appliance change or resting period.  10/30/2024 1333 by Linh Luna RN  Outcome: Progressing  10/30/2024 0118 by Earline Crocker 
  Problem: Discharge Planning  Goal: Discharge to home or other facility with appropriate resources  Outcome: Progressing     Problem: Pain  Goal: Verbalizes/displays adequate comfort level or baseline comfort level  Outcome: Progressing     Problem: Safety - Adult  Goal: Free from fall injury  Outcome: Progressing     Problem: Skin/Tissue Integrity  Goal: Absence of new skin breakdown  Description: 1.  Monitor for areas of redness and/or skin breakdown  2.  Assess vascular access sites hourly  3.  Every 4-6 hours minimum:  Change oxygen saturation probe site  4.  Every 4-6 hours:  If on nasal continuous positive airway pressure, respiratory therapy assess nares and determine need for appliance change or resting period.  Outcome: Progressing     Problem: Chronic Conditions and Co-morbidities  Goal: Patient's chronic conditions and co-morbidity symptoms are monitored and maintained or improved  Outcome: Progressing     
  Problem: Discharge Planning  Goal: Discharge to home or other facility with appropriate resources  Outcome: Progressing     Problem: Pain  Goal: Verbalizes/displays adequate comfort level or baseline comfort level  Outcome: Progressing     Problem: Safety - Adult  Goal: Free from fall injury  Outcome: Progressing  Flowsheets (Taken 10/27/2024 1128)  Free From Fall Injury: Based on caregiver fall risk screen, instruct family/caregiver to ask for assistance with transferring infant if caregiver noted to have fall risk factors     Problem: Skin/Tissue Integrity  Goal: Absence of new skin breakdown  Description: 1.  Monitor for areas of redness and/or skin breakdown  2.  Assess vascular access sites hourly  3.  Every 4-6 hours minimum:  Change oxygen saturation probe site  4.  Every 4-6 hours:  If on nasal continuous positive airway pressure, respiratory therapy assess nares and determine need for appliance change or resting period.  Outcome: Progressing     
  Problem: Discharge Planning  Goal: Discharge to home or other facility with appropriate resources  Outcome: Progressing  Flowsheets (Taken 10/22/2024 2000)  Discharge to home or other facility with appropriate resources:   Identify barriers to discharge with patient and caregiver   Refer to discharge planning if patient needs post-hospital services based on physician order or complex needs related to functional status, cognitive ability or social support system   Identify discharge learning needs (meds, wound care, etc)     Problem: Pain  Goal: Verbalizes/displays adequate comfort level or baseline comfort level  Outcome: Progressing     Problem: Safety - Adult  Goal: Free from fall injury  Outcome: Progressing     
  Problem: Discharge Planning  Goal: Discharge to home or other facility with appropriate resources  Outcome: Progressing  Flowsheets (Taken 10/23/2024 2000)  Discharge to home or other facility with appropriate resources: Identify barriers to discharge with patient and caregiver     Problem: Pain  Goal: Verbalizes/displays adequate comfort level or baseline comfort level  Outcome: Progressing     Problem: Safety - Adult  Goal: Free from fall injury  Outcome: Progressing     Problem: Physical Therapy - Adult  Goal: By Discharge: Performs mobility at highest level of function for planned discharge setting.  See evaluation for individualized goals.  Description: FUNCTIONAL STATUS PRIOR TO ADMISSION: Patient was independent and active without use of DME.    HOME SUPPORT PRIOR TO ADMISSION: The patient lived alone with family to provide assistance, if needed.    Physical Therapy Goals  Initiated 10/21/2024  1.  Patient will move from supine to sit and sit to supine in bed with independence within 7 day(s).    2.  Patient will perform sit to stand with independence within 7 day(s).  3.  Patient will transfer from bed to chair and chair to bed with modified independence using the least restrictive device within 7 day(s).  4.  Patient will ambulate with modified independence for 150 feet with the least restrictive device within 7 day(s).   5.  Patient will ascend/descend 4 stairs with  handrail(s) with supervision/set-up within 7 day(s).  6.  Patient will improve [place] Balance score by 7 points within 7 days.   10/23/2024 1421 by Yaneth Mayorga, PTA  Outcome: Progressing     
  Problem: Occupational Therapy - Adult  Goal: By Discharge: Performs self-care activities at highest level of function for planned discharge setting.  See evaluation for individualized goals.  Description: FUNCTIONAL STATUS PRIOR TO ADMISSION:  Patient was ambulatory without use of DME.     HOME SUPPORT: Patient lived alone and was independent with all ADLs.    Occupational Therapy Goals:  Initiated 10/21/2024  1.  Patient will perform standing grooming routine with Contact Guard Assist within 7 day(s).  2.  Patient will perform upper and lower body bathing with Contact Guard Assist within 7 day(s).  3.  Patient will perform upper and lower body dressing with Contact Guard Assist within 7 day(s).  4.  Patient will perform toilet transfers with Contact Guard Assist  within 7 day(s).  5.  Patient will perform all aspects of toileting with Contact Guard Assist within 7 day(s).  6.  Patient will participate in upper extremity therapeutic exercise/activities with Contact Guard Assist for 5 minutes within 7 day(s).    7.  Patient will utilize energy conservation techniques during functional activities with verbal cues within 7 day(s).    Outcome: Progressing     OCCUPATIONAL THERAPY TREATMENT    Patient: Stevon Callands (44 y.o. male)  Date: 10/25/2024  Primary Diagnosis: Hypertensive emergency [I16.1]       Precautions:  (SBP < 150)                Chart, occupational therapy assessment, plan of care, and goals were reviewed.    ASSESSMENT  Patient continues to benefit from skilled OT services and is progressing towards goals. Pt's performance of ADL/IADL tasks continues to be limited by impaired sitting/standing balance, activity tolerance, RUE coordination/FMC, R sided inattention, and cognition (high level problem solving). Pt received semi-supine and agreeable to participation in therapy session. He required CGA to complete seated UB/LB dressing in unsupported sitting - with cuing required to actively integrate RUE 
  Problem: Occupational Therapy - Adult  Goal: By Discharge: Performs self-care activities at highest level of function for planned discharge setting.  See evaluation for individualized goals.  Description: FUNCTIONAL STATUS PRIOR TO ADMISSION:  Patient was ambulatory without use of DME.     HOME SUPPORT: Patient lived alone and was independent with all ADLs.    Occupational Therapy Goals:  Initiated 10/21/2024  1.  Patient will perform standing grooming routine with Contact Guard Assist within 7 day(s).  2.  Patient will perform upper and lower body bathing with Contact Guard Assist within 7 day(s).  3.  Patient will perform upper and lower body dressing with Contact Guard Assist within 7 day(s).  4.  Patient will perform toilet transfers with Contact Guard Assist  within 7 day(s).  5.  Patient will perform all aspects of toileting with Contact Guard Assist within 7 day(s).  6.  Patient will participate in upper extremity therapeutic exercise/activities with Contact Guard Assist for 5 minutes within 7 day(s).    7.  Patient will utilize energy conservation techniques during functional activities with verbal cues within 7 day(s).    Outcome: Progressing   OCCUPATIONAL THERAPY EVALUATION    Patient: Stevon Callands (44 y.o. male)  Date: 10/21/2024  Primary Diagnosis: Hypertensive emergency [I16.1]         Precautions: Fall Risk                  ASSESSMENT :  The patient's performance of ADL/IADL tasks is limited at this time by impaired balance, activity tolerance, RUE hemiparesis, coordination/FMC, and cognition (attention, processing) s/p admission from home for hypertensive emergency. 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     Pt received semi-supine, alert, and agreeable to participation in therapy session. He completed bed mobility, sit<>stand transfers, and bed>chair transfer with upmost of min A x1-2. Pt endorsed sensation changes to RUE/RLE and demonstrated deficits in RUE strength, coordination, and 
  Problem: Occupational Therapy - Adult  Goal: By Discharge: Performs self-care activities at highest level of function for planned discharge setting.  See evaluation for individualized goals.  Description: FUNCTIONAL STATUS PRIOR TO ADMISSION:  Patient was ambulatory without use of DME.     HOME SUPPORT: Patient lived alone and was independent with all ADLs.    Occupational Therapy Goals:  Initiated 10/21/2024  1.  Patient will perform standing grooming routine with Contact Guard Assist within 7 day(s).  2.  Patient will perform upper and lower body bathing with Contact Guard Assist within 7 day(s).  3.  Patient will perform upper and lower body dressing with Contact Guard Assist within 7 day(s).  4.  Patient will perform toilet transfers with Contact Guard Assist  within 7 day(s).  5.  Patient will perform all aspects of toileting with Contact Guard Assist within 7 day(s).  6.  Patient will participate in upper extremity therapeutic exercise/activities with Contact Guard Assist for 5 minutes within 7 day(s).    7.  Patient will utilize energy conservation techniques during functional activities with verbal cues within 7 day(s).    Outcome: Progressing   OCCUPATIONAL THERAPY TREATMENT  Patient: Stevon Callands (44 y.o. male)  Date: 10/22/2024  Primary Diagnosis: Hypertensive emergency [I16.1]       Precautions:  (SBP < 150)                Chart, occupational therapy assessment, plan of care, and goals were reviewed.    ASSESSMENT  Patient continues to benefit from skilled OT services and is progressing towards goals. Pt's performance of ADL/IADL tasks continues to be limited by impaired balance, activity tolerance, RUE hemiparesis, coordination/FMC, and cognition (high level insight/safety awareness). Pt demonstrated good progress towards goals today, completing bed mobility, functional transfers, brief household mobility, and standing grooming tasks with CGA-min A x1-2 and unilateral HHA. Unfortunately, further 
  Problem: Occupational Therapy - Adult  Goal: By Discharge: Performs self-care activities at highest level of function for planned discharge setting.  See evaluation for individualized goals.  Description: FUNCTIONAL STATUS PRIOR TO ADMISSION:  Patient was ambulatory without use of DME.     HOME SUPPORT: Patient lived alone and was independent with all ADLs.    Occupational Therapy Goals:  Initiated 10/21/2024, all reviewed and continued at weekly re-assessment 10/28/2024:  1.  Patient will perform standing grooming routine with Contact Guard Assist within 7 day(s).  2.  Patient will perform upper and lower body bathing with Contact Guard Assist within 7 day(s).  3.  Patient will perform upper and lower body dressing with Contact Guard Assist within 7 day(s).  4.  Patient will perform toilet transfers with Contact Guard Assist  within 7 day(s).  5.  Patient will perform all aspects of toileting with Contact Guard Assist within 7 day(s).  6.  Patient will participate in upper extremity therapeutic exercise/activities with Contact Guard Assist for 5 minutes within 7 day(s).    7.  Patient will utilize energy conservation techniques during functional activities with verbal cues within 7 day(s).    Outcome: Progressing   OCCUPATIONAL THERAPY TREATMENT: WEEKLY REASSESSMENT    Patient: Stevon Callands (44 y.o. male)  Date: 10/28/2024  Primary Diagnosis: Hypertensive emergency [I16.1]       Precautions:  (SBP < 150)                Chart, occupational therapy assessment, plan of care, and goals were reviewed.    ASSESSMENT  Patient continues to benefit from skilled OT services and is progressing towards goals. Pt's performance of ADL/IADL tasks continues to be limited by impaired standing balance, activity tolerance, coordination/FMC, and cognition (insight, attention, high level safety awareness). Pt received seated in bedside chair and agreeable to participation in therapy session. He continues to require CGA-min A to 
  Problem: Physical Therapy - Adult  Goal: By Discharge: Performs mobility at highest level of function for planned discharge setting.  See evaluation for individualized goals.  Description: FUNCTIONAL STATUS PRIOR TO ADMISSION: Patient was independent and active without use of DME.    HOME SUPPORT PRIOR TO ADMISSION: The patient lived alone with family to provide assistance, if needed.    Physical Therapy Goals  Initiated 10/21/2024  1.  Patient will move from supine to sit and sit to supine in bed with independence within 7 day(s).    2.  Patient will perform sit to stand with independence within 7 day(s).  3.  Patient will transfer from bed to chair and chair to bed with modified independence using the least restrictive device within 7 day(s).  4.  Patient will ambulate with modified independence for 150 feet with the least restrictive device within 7 day(s).   5.  Patient will ascend/descend 4 stairs with  handrail(s) with supervision/set-up within 7 day(s).  6.  Patient will improve Ponce Balance score by 7 points within 7 days.   Outcome: Progressing       PHYSICAL THERAPY TREATMENT    Patient: Stevon Callands (44 y.o. male)  Date: 10/22/2024  Diagnosis: Hypertensive emergency [I16.1] Hypertensive emergency      Precautions:  (SBP < 150)                      ASSESSMENT:  Patient continues to benefit from skilled PT services and is progressing towards goals. Pt BP within limits. Pt had decrease right LE stability and placement with gait. Pt reaching for objects to assist with balance. Pt had increase nausea with mobility. +vomiting. RN addressing.           PLAN:  Patient continues to benefit from skilled intervention to address the above impairments.  Continue treatment per established plan of care.        Recommendation for discharge: (in order for the patient to meet his/her long term goals):   High intensity/comprehensive skilled physical therapy in a multidisciplinary setting as patient is working towards 
  Problem: Physical Therapy - Adult  Goal: By Discharge: Performs mobility at highest level of function for planned discharge setting.  See evaluation for individualized goals.  Description: FUNCTIONAL STATUS PRIOR TO ADMISSION: Patient was independent and active without use of DME.    HOME SUPPORT PRIOR TO ADMISSION: The patient lived alone with family to provide assistance, if needed.    Physical Therapy Goals  Initiated 10/21/2024  1.  Patient will move from supine to sit and sit to supine in bed with independence within 7 day(s).    2.  Patient will perform sit to stand with independence within 7 day(s).  3.  Patient will transfer from bed to chair and chair to bed with modified independence using the least restrictive device within 7 day(s).  4.  Patient will ambulate with modified independence for 150 feet with the least restrictive device within 7 day(s).   5.  Patient will ascend/descend 4 stairs with  handrail(s) with supervision/set-up within 7 day(s).  6.  Patient will improve Ponce Balance score by 7 points within 7 days.   Outcome: Progressing       PHYSICAL THERAPY TREATMENT    Patient: Stevon Callands (44 y.o. male)  Date: 10/23/2024  Diagnosis: Hypertensive emergency [I16.1] Hypertensive emergency      Precautions:  (SBP < 150)                      ASSESSMENT:  Patient continues to benefit from skilled PT services and is progressing towards goals. Pt agreeable and eager to ambulate. Pt had decrease right LE stability and foot placement. Pt had decrease right step length and periodic catching during swing phase. Pt has decrease sensation in right LE affecting foot placement and balance.          PLAN:  Patient continues to benefit from skilled intervention to address the above impairments.  Continue treatment per established plan of care.        Recommendation for discharge: (in order for the patient to meet his/her long term goals):   High intensity/comprehensive skilled physical therapy in a 
  Problem: Physical Therapy - Adult  Goal: By Discharge: Performs mobility at highest level of function for planned discharge setting.  See evaluation for individualized goals.  Description: FUNCTIONAL STATUS PRIOR TO ADMISSION: Patient was independent and active without use of DME.    HOME SUPPORT PRIOR TO ADMISSION: The patient lived alone with family to provide assistance, if needed.    Physical Therapy Goals  Initiated 10/21/2024  1.  Patient will move from supine to sit and sit to supine in bed with independence within 7 day(s).    2.  Patient will perform sit to stand with independence within 7 day(s).  3.  Patient will transfer from bed to chair and chair to bed with modified independence using the least restrictive device within 7 day(s).  4.  Patient will ambulate with modified independence for 150 feet with the least restrictive device within 7 day(s).   5.  Patient will ascend/descend 4 stairs with  handrail(s) with supervision/set-up within 7 day(s).  6.  Patient will improve Ponce Balance score by 7 points within 7 days.   Outcome: Progressing       PHYSICAL THERAPY TREATMENT    Patient: Stevon Callands (44 y.o. male)  Date: 10/25/2024  Diagnosis: Hypertensive emergency [I16.1] Hypertensive emergency      Precautions:  (SBP < 150)                      ASSESSMENT:  Patient continues to benefit from skilled PT services and is progressing towards goals. Pt presents with decrease right UE/LE strength and sensation. Pt has decrease right step length and DF. Pt has path deviation to L/R. Pt with decrease right arm swing during gait. Pt was able to perform standing exercises. Noted decrease right knee stability with mini squats          PLAN:  Patient continues to benefit from skilled intervention to address the above impairments.  Continue treatment per established plan of care.        Recommendation for discharge: (in order for the patient to meet his/her long term goals):   High intensity/comprehensive 
  Problem: Physical Therapy - Adult  Goal: By Discharge: Performs mobility at highest level of function for planned discharge setting.  See evaluation for individualized goals.  Description: FUNCTIONAL STATUS PRIOR TO ADMISSION: Patient was independent and active without use of DME.    HOME SUPPORT PRIOR TO ADMISSION: The patient lived alone with family to provide assistance, if needed.    Physical Therapy Goals  Revised goals - 10/28/2024  1.  Patient will move from supine to sit and sit to supine in bed with independence within 7 day(s).    2.  Patient will perform sit to stand with independence within 7 day(s).  3.  Patient will transfer from bed to chair and chair to bed with independence using the least restrictive device within 7 day(s).  4.  Patient will ambulate with modified independence for 150 feet with the least restrictive device within 7 day(s).   5.  Patient will ascend/descend 4 stairs with  handrail(s) with supervision/set-up within 7 day(s).  6.  Patient will improve Ponce Balance score by 7 points within 7 days.       Initiated 10/21/2024  1.  Patient will move from supine to sit and sit to supine in bed with independence within 7 day(s).    2.  Patient will perform sit to stand with independence within 7 day(s).  3.  Patient will transfer from bed to chair and chair to bed with modified independence using the least restrictive device within 7 day(s).  4.  Patient will ambulate with modified independence for 150 feet with the least restrictive device within 7 day(s).   5.  Patient will ascend/descend 4 stairs with  handrail(s) with supervision/set-up within 7 day(s).  6.  Patient will improve Ponce Balance score by 7 points within 7 days.   Outcome: Progressing    PHYSICAL THERAPY TREATMENT: WEEKLY REASSESSMENT    Patient: Stevon Callands (44 y.o. male)  Date: 10/28/2024  Primary Diagnosis: Hypertensive emergency [I16.1]       Precautions:  (SBP < 150)                      ASSESSMENT :  Patient 
Speech LAnguage Pathology TREATMENT/DISCHARGE    Patient: Stevon Callands (44 y.o. male)  Date: 10/22/2024  Primary Diagnosis: Hypertensive emergency [I16.1]    Precautions: SBP < 150                  ASSESSMENT:  Therapy targeted swallowing and speech. Patient reports good tolerance of current PO diet and ongoing improvement in speech function. Patient able to identify appropriate compensatory speech strategies independently. Speech intelligibility judged to be intact. Reviewed results and recommendations from FEES yesterday. Recommend he continue unrestricted PO diet with general swallow precautions.    Patient will be discharged from skilled speech-language pathology services at this time.     PLAN :  Recommendations and Planned Interventions:  Diet: Regular and thin liquids  Oral medications whole with liquids  Speech: Slow rate and over-articulate as needed     Acute SLP Services: No, patient will be discharged from acute skilled speech-language pathology at this time.  Discharge Recommendations: No, additional SLP treatment not indicated at discharge     SUBJECTIVE:   Patient stated, “I will do what yall tell me to.”    OBJECTIVE:     Past Medical History:   Diagnosis Date    Hypertension    No past surgical history on file.    Prior Level of Function/Home Situation:   Current Diet: Regular texture with thin liquids    Cognitive and Communication Status:  Neurologic State: Alert  Orientation Level: Oriented x4  Cognition: Appropriate decision making, Appropriate for age attention/concentration, Appropriate safety awareness, and Follows commands    Dysphagia:  Reviewed FEES, Formal PO trials deferred 2/2 patient complaint of nausea    Motor Speech:  Intelligibility: Intact   Vocal Quality: WFL   Vocal Intensity: WFL   Articulation: WFL  Rate: WFL   Resonance: WFL   Prosody: WFL   Breath support: Adequate for speech    Respiratory Status/Airway:  Room air                         Outcome Measure:  Functional Oral 
Speech Language Pathology  Flexible Endoscopic Evaluation of Swallowing-FEES and Motor Speech Evaluation  Patient: Stevon Callands (44 y.o. 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 Procedure discussed with patient prior to FEES being completed and pt cooperative throughout study.     Patient presents with functional oropharyngeal phases of swallow with no aspiration, penetration, nor significant pharyngeal residue. No evidence of proximal escape, and pt with timely and efficient oral phase of swallow. Recommend pt continue baseline diet. Will follow up for diet tolerance.    Additionally, pt presents with mild dysarthria characterized by minimal articulatory precision of labial and lingual phonemes due to oral weakness. Patient is fully intelligible, but certainly notices a difference in his speech. SLP will continue to follow for motor speech intervention and possible cognitive evaluation as indicated.     Patient will benefit from skilled intervention to address the above impairments.       PLAN :  Recommendations and Planned Interventions:  Diet: Regular and thin liquids  General aspiration precautions  Good oral care  Small sips/bites  Hold if short of breath        Acute SLP Services: Yes, patient will be followed by speech-language pathology 3x/week to address goals. Patient's rehabilitation potential is considered to be Excellent.  Discharge Recommendations: Continue to assess pending progress     SUBJECTIVE:   Patient stated “I think that sounds good”.    OBJECTIVE:     Past Medical History:   Diagnosis Date    Hypertension    No past surgical history on file.  Prior Level of Function/Home Situation:   Social/Functional History  Lives With: Alone  Type of Home: House  Home 
multidisciplinary setting as patient is working towards tolerating up to 3 hours of therapy/day 5-7x/week    Other factors to consider for discharge: concern for safely navigating or managing the home environment    IF patient discharges home will need the following DME: none       SUBJECTIVE:   Patient stated, \"this feels good.\"    OBJECTIVE DATA SUMMARY:   Critical Behavior:          Functional Mobility Training:  Bed Mobility:  Bed Mobility Training  Supine to Sit: Stand-by assistance  Transfers:  Transfer Training  Sit to Stand: Contact-guard assistance  Stand to Sit: Contact-guard assistance  Balance:  Balance  Sitting: Intact  Standing: Impaired  Standing - Static: Fair  Standing - Dynamic: Fair   Ambulation/Gait Training:     Gait  Overall Level of Assistance: Minimum assistance  Assistive Device: Gait belt  Base of Support: Widened;Center of gravity altered  Step Length: Right shortened;Left shortened  Swing Pattern: Right asymmetrical  Gait Abnormalities: Path deviations;Decreased step clearance        Neuro Re-Education:          Pain Rating:  No complaints   Pain Intervention(s):       Activity Tolerance:   requires rest breaks    After treatment:   Patient left in no apparent distress sitting up in chair, Call bell within reach, and Bed/ chair alarm activated      COMMUNICATION/EDUCATION:   The patient's plan of care was discussed with: registered nurse    Patient Education  Education Given To: Patient  Education Provided: Plan of Care  Education Method: Verbal  Barriers to Learning: None  Education Outcome: Verbalized understanding      NICHOLAS CAGLE PTA  Minutes: 24    
Provided: Plan of Care;Role of Therapy;Fall Prevention Strategies;Precautions  Education Method: Verbal  Barriers to Learning: None  Education Outcome: Verbalized understanding    Thank you for this referral.  Ryanne Morillo, PT  Minutes: 23      Physical Therapy Evaluation Charge Determination   History Examination Presentation Decision-Making   LOW Complexity : Zero comorbidities / personal factors that will impact the outcome / POC LOW Complexity : 1-2 Standardized tests and measures addressing body structure, function, activity limitation and / or participation in recreation  LOW Complexity : Stable, uncomplicated  Ponce Balance Test  LOW    Based on the above components, the patient evaluation is determined to be of the following complexity level: Low

## 2024-10-30 NOTE — PROGRESS NOTES
[facility] [place] [facility]                                                                                          Hospitalist Progress Note  Eli Knight MD  Office Phone: [phone]        Date of Service:  10/29/2024  NAME:  Stevon Callands  :  1980  MRN:  [id number]       Admission Summary:   45 yo man with HTN and CKD Stage 3 was admitted to [facility] ICU under CCM service from [facility] ED on 10/20/2024 with hypertensive emergency, KILEY, vertical nystagmus, RUE dysmetria and numbness, and RLE numbness, and pontine hemorrhage with cerebral edema and midline shift. Pt was started on nicardipine gtt prior to transfer. Neurology was consulted. HM was consulted 10/23 for transfer of care.      Interval history / Subjective:   Pt was seen/examined at bedside in follow up. He still c/o numbness in the R leg, R arm, R foot but R-sided weakness has improved. Lost PIV access today. SBP 140s.      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  Mild R hemiparesis  - NSGY consulted, no surgical intervention  - hold ASA due to bleeding  - MRI brain 10/24 stable acute intraparenchymal hemorrhage in left michell with mild surrounding edema but no significant mass effect; no acute infarct, minimal chronic microvascular ischemic disease  - ; continue 80 mg atorvastatin nightly for goal LDL <70  - A1C 5.7,   - TTE 10/21 EF 55-60%, moderate concentric hypertrophy, no shunt, NWM, normal diastolic function, negative bubble study  - evaluated by Neurology: no [place] for 6 months [place]  - continue q4hr neuro checks  - PT/OT recommending IPR and [facility] resubmitted auth 10/28 due to incorrect insurance info  - follow up outpatient NIS for possible angiogram to evaluate for underlying AVM  - follow up Neuro 4-8 weeks     Hypertensive emergency on chronic HTN  - s/p nicardipine gtt 10/20-10/22  - continue home amlodipine, carvedilol and doxazosin  - 
        [facility] [place] [facility]                                                                                          Hospitalist Progress Note  Eli Knight MD  Answering service: [phone] OR [phone] from in house phone        Date of Service:  10/25/2024  NAME:  Stevon Callands  :  1980  MRN:  [id number]       Admission Summary:   Stevon Callands is a 44 y.o. male with past medical history of HTN who presented to [facility] ED 10/20 with complaints of hypertension, dizziness, and nausea. He reportedly had not taken his blood pressure medications in six months. Initial BP was 217/121, head CT showed michell hemorrhage with some localized edema and no shift. He was placed on Cardene infusion and transferred to [facility] ICU for neurology evaluation and BP management. Imaging was reviewed by neurosurgery and no surgical intervention was recommended. Hospitalist was consulted 10/23 for ICU downgrade.          Interval history / Subjective:   10/24  denies fever chest pain shortness of breath, no n/v/d       Assessment & Plan:      IPH in setting of HTN emergency  - NSGY consulted, no surgical intervention  - Hold ASA due to bleeding  - PT/OT following, recommending IPR  - brain MRI: stable acute intraparenchymal hemorrhage in left michell with mild surrounding edema, but no significant mass effect  -  - continue 80 mg atorvastatin nightly for goal LDL <70  - A1C 5.7  - Echo: EF 55-60%, moderate concentric hypertrophy, no shunt  - evaluated by Neurology: no driving for 6 months per [place] regulations, needs outpatient NIS follow-up for possible angiogram to evaluate for underlying AVM, needs Neuro follow up in 4-8 weeks  - continue q4hr neuro checks     Hypertension  - SBP goal <150 due to above  - Cardene infusion stopped 10/22  - continue home amlodipine and carvedilol  - started on 100 mg hydralazine TID 10/22, will continue  - 0.1 mg clonidine BID added 10/23  - PRN labetalol     KILEY on CKD 
        [facility] [place] [facility]                                                                                          Hospitalist Progress Note  CYNDI Wadsworth - CNP  Answering service: [phone] OR [phone] from in house phone        Date of Service:  10/24/2024  NAME:  Stevon Callands  :  1980  MRN:  [id number]       Admission Summary:   Stevon Callands is a 44 y.o. male with past medical history of HTN who presented to [facility] ED 10/20 with complaints of hypertension, dizziness, and nausea. He reportedly had not taken his blood pressure medications in six months. Initial BP was 217/121, head CT showed michell hemorrhage with some localized edema and no shift. He was placed on Cardene infusion and transferred to [facility] ICU for neurology evaluation and BP management. Imaging was reviewed by neurosurgery and no surgical intervention was recommended. Hospitalist was consulted 10/23 for ICU downgrade.          Interval history / Subjective:   10/24  denies fever chest pain shortness of breath, no n/v/d     Assessment & Plan:      IPH in setting of HTN emergency  - NSGY consulted, no surgical intervention  - Hold ASA due to bleeding  - PT/OT following, recommending IPR  - brain MRI: stable acute intraparenchymal hemorrhage in left michell with mild surrounding edema, but no significant mass effect  -  - continue 80 mg atorvastatin nightly for goal LDL <70  - A1C 5.7  - Echo: EF 55-60%, moderate concentric hypertrophy, no shunt  - evaluated by Neurology: no driving for 6 months per [place] regulations, needs outpatient NIS follow-up for possible angiogram to evaluate for underlying AVM, needs Neuro follow up in 4-8 weeks  - continue q4hr neuro checks     Hypertension  - SBP goal <150 due to above  - Cardene infusion stopped 10/22  - continue home amlodipine and carvedilol  - started on 100 mg hydralazine TID 10/22, will continue  - 0.1 mg clonidine BID added 10/23  - PRN 
        [facility] [place] [facility]                                                                                          Hospitalist Progress Note  Eli Knight MD  Answering service: [phone] OR [phone] from in house phone        Date of Service:  10/26/2024  NAME:  Stevon Callands  :  1980  MRN:  [id number]       Admission Summary:   Stevon Callands is a 44 y.o. male with past medical history of HTN who presented to [facility] ED 10/20 with complaints of hypertension, dizziness, and nausea. He reportedly had not taken his blood pressure medications in six months. Initial BP was 217/121, head CT showed michell hemorrhage with some localized edema and no shift. He was placed on Cardene infusion and transferred to [facility] ICU for neurology evaluation and BP management. Imaging was reviewed by neurosurgery and no surgical intervention was recommended. Hospitalist was consulted 10/23 for ICU downgrade.          Interval history / Subjective:   10/24  denies fever chest pain shortness of breath, no n/v/d  BP slightly better        Assessment & Plan:      IPH in setting of HTN emergency  - NSGY consulted, no surgical intervention  - Hold ASA due to bleeding  - PT/OT following, recommending IPR  - brain MRI: stable acute intraparenchymal hemorrhage in left michell with mild surrounding edema, but no significant mass effect  -  - continue 80 mg atorvastatin nightly for goal LDL <70  - A1C 5.7  - Echo: EF 55-60%, moderate concentric hypertrophy, no shunt  - evaluated by Neurology: no driving for 6 months [place], needs outpatient NIS follow-up for possible angiogram to evaluate for underlying AVM, needs Neuro follow up in 4-8 weeks  - continue q4hr neuro checks     Hypertension  - SBP goal <150 due to above  - Cardene infusion stopped 10/22  - continue home amlodipine and carvedilol  - started on 100 mg hydralazine TID 10/22, will continue  - PRN labetalol  Restart home cardura      KILEY 
     CRITICAL CARE NOTE      Name: Stevon Callands   : 1980   MRN: [id number]   Date: 10/22/2024      REASON FOR ICU ADMISSION:  HTN emergency, Pontine Hemorrhage     BRIEF PATIENT SUMMARY AND HOSPITAL COURSE   Stevon Callands is a 45yo male with pmh of HTN, who presented to ED for \"dizziness\". Has not been taking home anti-hypertensive's since April of this year. BP in 220's while in ED, started on cardene gtt. CTH showed michell hemorrhage with localized edema and no shift. Tx to [facility] ICU for neuro consult and BP management.     10/22: Cardene continues, restarted some home meds yesterday. Adding more anti-htn meds and increasing dose to liberate off of cardene drip.     COMPREHENSIVE ASSESSMENT & PLAN   [facility] iso HTN emergency  - Likely related to uncontrolled HTN  - Stroke precautions  - Q2/Q4 neuro checks  - Neurology following  - NSGY consulted, no sx intervention  - Hold ASA iso head bleed  - PT/OT/SLP evals  - MRI brain completed  - , goal <70. Start high intensity atorvastatin 80mg nightly.   - HgbA1C 5.7    Hypertension  - SBP goal <150  - Restarted home amlodipine  - Restarted home carvedilol   - Added hydralazine and increased to max dose in order to wean off cardene drip  - Echo: EF 55-60%, moderate concentric hypertrophy, no shunt.     KILEY on CKD stage 3  - Cr 3.12 on admission, down trending--> 2.9  - Per chart review, baseline is usually 1.3-1.6  - Voiding, adequate urine OP.   - Encourage PO intake, avoid hypotension and nephrotoxic medications    Electrolyte derangements  - Hypokalemia and hypocalcemia  - Replace electrolytes as needed  - Encourage PO intake  - Passed SLP today, on reg diet       ICU DAILY CHECKLIST     Code Status: Full Code    DVT Prophylaxis: SCDs  T/L/D: Tubes: None  Lines: Peripheral IV  Drains: None  SUP: not indicated  Diet: ADULT DIET; Regular   Activity Level: up as tolerated  ABCDEF Bundle/Checklist Completed: Yes  Disposition: Stay in ICU  Multidisciplinary Rounds 
    [address], [facility] [place] [address]  Phone: [phone]   [phone]    [email or web address]     Nephrology Progress Note    Patient Name : Stevon Callands      : 1980     MRN : [id number]  Date of Admission : 10/20/2024  Date of Servive : 10/30/24    CC:  Follow up for KILEY on CKD       Assessment and Plan   KILEY on CKD:  - suspect malignant HTN leading to sudden rise in Cr  - U/S neg  - Cr  overall stable  - may take several weeks to star to improve  - cont present meds  - no urgent need for RRT  - ok for d/c to rehab once available     CKD 3a:  - from hypertensive nephrosclerosis  - no proteinuria in the past, will repeat UA while here  - baseline Cr 1.4 to 1.6 in      Hypertensive emergency:  - cont hydralazine, amlodipine and metoprolol for now     ICH:  - per Neuro     Interval History:  Seen and examined.  Resting in bed.  BP stable.  One low overnight, otherwise stable.  Cr stable.  No cp, ssob, n/v/d.  For d/c to [facility] today    Review of Systems: Pertinent items are noted in HPI.    Current Medications:   Current Facility-Administered Medications   Medication Dose Route Frequency    cloNIDine (CATAPRES) tablet 0.1 mg  0.1 mg Oral Q6H PRN    doxazosin (CARDURA) tablet 2 mg  2 mg Oral Nightly    loperamide (IMODIUM) capsule 4 mg  4 mg Oral 4x Daily PRN    sennosides-docusate sodium (SENOKOT-S) 8.6-50 MG tablet 1 tablet  1 tablet Oral Daily    hydrALAZINE (APRESOLINE) tablet 100 mg  100 mg Oral 3 times per day    carvedilol (COREG) tablet 25 mg  25 mg Oral BID WC    labetalol (NORMODYNE;TRANDATE) injection 10 mg  10 mg IntraVENous Q4H PRN    amLODIPine (NORVASC) tablet 10 mg  10 mg Oral Daily    sodium chloride flush 0.9 % injection 5-40 mL  5-40 mL IntraVENous 2 times per day    sodium chloride flush 0.9 % injection 5-40 mL  5-40 mL IntraVENous PRN    ondansetron (ZOFRAN-ODT) disintegrating tablet 4 mg  4 mg Oral Q8H PRN    Or    
    [address], [facility] [place] [address]  Phone: [phone]   [phone]    [email or web address]     Nephrology Progress Note    Patient Name : Stevon Callands      : 1980     MRN : [id number]  Date of Admission : 10/20/2024  Date of Servive : 10/24/24    CC:  Follow up for KILEY on CKD       Assessment and Plan   KILEY on CKD:  - suspect malignant HTN leading to sudden rise in Cr  - U/S neg  - Cr up some, overall stable  - will adjust BP meds  - goal -150/90  - daily labs  - no urgent need for RRT  - ok for d/c to rehab once available     CKD 3a:  - from hypertensive nephrosclerosis  - no proteinuria in the past, will repeat UA while here  - baseline Cr 1.4 to 1.6 in      Hypertensive emergency:  - stop clonidine  - cont hydralazine, amlodipine and metoprolol for now     ICH:  - per Neuro     Interval History:  Seen and examined.  Feeling ok.  Received all his BP meds this AM.  BP controlled overnight.  No HA, n/v/d, cp or sob.      Review of Systems: Pertinent items are noted in HPI.    Current Medications:   Current Facility-Administered Medications   Medication Dose Route Frequency    sennosides-docusate sodium (SENOKOT-S) 8.6-50 MG tablet 1 tablet  1 tablet Oral Daily    hydrALAZINE (APRESOLINE) tablet 100 mg  100 mg Oral 3 times per day    carvedilol (COREG) tablet 25 mg  25 mg Oral BID WC    labetalol (NORMODYNE;TRANDATE) injection 10 mg  10 mg IntraVENous Q4H PRN    amLODIPine (NORVASC) tablet 10 mg  10 mg Oral Daily    sodium chloride flush 0.9 % injection 5-40 mL  5-40 mL IntraVENous 2 times per day    sodium chloride flush 0.9 % injection 5-40 mL  5-40 mL IntraVENous PRN    ondansetron (ZOFRAN-ODT) disintegrating tablet 4 mg  4 mg Oral Q8H PRN    Or    ondansetron (ZOFRAN) injection 4 mg  4 mg IntraVENous Q6H PRN    polyethylene glycol (GLYCOLAX) packet 17 g  17 g Oral Daily PRN    [Held by provider] enoxaparin (LOVENOX) injection 
    [address], [place] [address]  Phone: [phone]   [phone]    [email or web address]     Nephrology Progress Note    Patient Name : Stevon Callands      : 1980     MRN : [id number]  Date of Admission : 10/20/2024  Date of Servive : 10/29/24    CC:  Follow up for KILEY on CKD       Assessment and Plan   KILEY on CKD:  - suspect malignant HTN leading to sudden rise in Cr  - U/S neg  - Cr up some, overall stable  - may take several weeks to star to improve  - cont present meds  - no urgent need for RRT  - ok for d/c to rehab once available     CKD 3a:  - from hypertensive nephrosclerosis  - no proteinuria in the past, will repeat UA while here  - baseline Cr 1.4 to 1.6 in      Hypertensive emergency:  - cont hydralazine, amlodipine and metoprolol for now     ICH:  - per Neuro     Interval History:  Seen and examined.  Feeling ok.  BP remaining stable.  No cp, ssob, n/v/d    Review of Systems: Pertinent items are noted in HPI.    Current Medications:   Current Facility-Administered Medications   Medication Dose Route Frequency    cloNIDine (CATAPRES) tablet 0.1 mg  0.1 mg Oral Q6H PRN    doxazosin (CARDURA) tablet 2 mg  2 mg Oral Nightly    loperamide (IMODIUM) capsule 4 mg  4 mg Oral 4x Daily PRN    sennosides-docusate sodium (SENOKOT-S) 8.6-50 MG tablet 1 tablet  1 tablet Oral Daily    hydrALAZINE (APRESOLINE) tablet 100 mg  100 mg Oral 3 times per day    carvedilol (COREG) tablet 25 mg  25 mg Oral BID WC    labetalol (NORMODYNE;TRANDATE) injection 10 mg  10 mg IntraVENous Q4H PRN    amLODIPine (NORVASC) tablet 10 mg  10 mg Oral Daily    sodium chloride flush 0.9 % injection 5-40 mL  5-40 mL IntraVENous 2 times per day    sodium chloride flush 0.9 % injection 5-40 mL  5-40 mL IntraVENous PRN    ondansetron (ZOFRAN-ODT) disintegrating tablet 4 mg  4 mg Oral Q8H PRN    Or    ondansetron (ZOFRAN) injection 4 mg  4 mg IntraVENous Q6H PRN    
    [address], [facility] [place] [address]  Phone: [phone]   [phone]    [email or web address]     Nephrology Progress Note    Patient Name : Stevon Callands      : 1980     MRN : [id number]  Date of Admission : 10/20/2024  Date of Servive : 10/25/24    CC:  Follow up for KILEY on CKD       Assessment and Plan   KILEY on CKD:  - suspect malignant HTN leading to sudden rise in Cr  - U/S neg  - Cr up some, overall stable  - may take several weeks to star to improve  - daily labs while here  - no urgent need for RRT  - ok for d/c to rehab once available     CKD 3a:  - from hypertensive nephrosclerosis  - no proteinuria in the past, will repeat UA while here  - baseline Cr 1.4 to 1.6 in      Hypertensive emergency:  - cont hydralazine, amlodipine and metoprolol for now     ICH:  - per Neuro     Interval History:  Seen and examined.  Feeling ok.  BP controlled.  No cp, sob,n v/d/.  Awaiting dispo to rehab    Review of Systems: Pertinent items are noted in HPI.    Current Medications:   Current Facility-Administered Medications   Medication Dose Route Frequency    loperamide (IMODIUM) capsule 4 mg  4 mg Oral 4x Daily PRN    sennosides-docusate sodium (SENOKOT-S) 8.6-50 MG tablet 1 tablet  1 tablet Oral Daily    hydrALAZINE (APRESOLINE) tablet 100 mg  100 mg Oral 3 times per day    carvedilol (COREG) tablet 25 mg  25 mg Oral BID WC    labetalol (NORMODYNE;TRANDATE) injection 10 mg  10 mg IntraVENous Q4H PRN    amLODIPine (NORVASC) tablet 10 mg  10 mg Oral Daily    sodium chloride flush 0.9 % injection 5-40 mL  5-40 mL IntraVENous 2 times per day    sodium chloride flush 0.9 % injection 5-40 mL  5-40 mL IntraVENous PRN    ondansetron (ZOFRAN-ODT) disintegrating tablet 4 mg  4 mg Oral Q8H PRN    Or    ondansetron (ZOFRAN) injection 4 mg  4 mg IntraVENous Q6H PRN    polyethylene glycol (GLYCOLAX) packet 17 g  17 g Oral Daily PRN    [Held by provider] 
  ICH Documentation Note     Brief HPI: 44-year-old right-handed male who initially presented to [facility] today after waking up about 7:40 am to go to the bathroom and had dizziness. Patient felt like the room was spinning. He also felt like his eye was irritated as if something was in it. He layed back down and tried to get up again and his symptoms were more pronounced when he tried to walk in the hallway. He felt like his balance was off. His kids were with him and his son ended up calling 911. He had some associated nausea with his symptoms. He has no prior history of a CVA. He is not taking any blood thinning agents at home. He has HTN, but has not taken his BP medications since April of this year. He reports he is going through a divorce and his meds was left at the house when he moved out and he has not been able to access them. His PCP left the practice and so he had to find another PCP and was not able to get an appointment until November. In the ED at [place], his BP was elevated at and was as high as 224/123. He was started on Cardene for BP control. He reports occasional social drinking. Denies any smoking use or illicit drug use. CT of Head showed a left pontine hemorrhage with surrounding edema, but no significant mass effect. Pt report tingling in his right hand and right foot. He denies any weakness or vision changes. He does report a posterior dull headache rated 4/10 that started this morning.     Medical hx  Hypertension, CKD      Traumatic ICH?   NO (see ICH score)   ICH Score ON   ARRIVAL:    Donovan [place]/Modified Rizzo on arrival (if indicated):      1    Imaging:   CT Result (most recent):  Head CT: Acute intraparenchymal hemorrhage in the left michell measuring 1.5 x 1.0 x 0.8 cm, with minimal surrounding edema, but no significant mass effect.     CTA HEAD NECK W CONTRAST 10/20/2024    Narrative  EXAM:  CTA CODE NEURO HEAD AND NECK W CONT, CT BRAIN PERFUSION    INDICATION:  
  Physician Progress Note      PATIENT:               BERRY WATTERS  CSN #:                  [id number]  :                       1980  ADMIT DATE:       10/20/2024 12:52 PM  DISCH DATE:  RESPONDING  PROVIDER #:        Eli Knight MD          QUERY TEXT:    Pt admitted with nontraumatic Intraparenchymal hemorrhage.  Pt noted to have   radiology finding or documentation of edema.  If clinically significant,   please document in progress notes and discharge summary if you are   evaluating/treating any of the following:    The medical record reflects the following:  Risk Factors: 44M w/1d fatigue, dizziness, nausea. Pt notes off his BP meds   for past few months. No syncope/LOC or head/neck pain.  Clinical Indicators: 10/20 Neuro: Possible hypertensive hemorrhage, though   unknown usual location, may have had an ischemic stroke with hemorrhagic   conversion due to severe hypertension.  Per H&P - CTH done with michell hemorrhage with some localized edema and no   shift.  Treatment: ICU monitoring    Thank you,  Amarilis Freire RN, CDI  [email or web address]  >  Options provided:  -- Cerebral edema  -- Other - I will add my own diagnosis  -- Disagree - Not applicable / Not valid  -- Disagree - Clinically unable to determine / Unknown  -- Refer to Clinical Documentation Reviewer    PROVIDER RESPONSE TEXT:    This patient has cerebral edema.    Query created by: Amarilis Freire on 10/24/2024 12:37 PM      Electronically signed by:  Eli Knight MD 10/26/2024 4:47 PM          
2325: Pt down to MRI with this RN and 1 PCT.     0000: SBP in 150's,1x dose Hydralazine 10mg given IVP.     0007: Returned from MRI with this RN and 1 PCT. 's Cardene gtt restarted.   
Attended interdisciplanary rounds on 6W Peds Unit where patient care was discussed.  Sabina Freire  Chaplain Resident  [phone]   
Called report to [facility], reviewed patient's hospital problems, reviewed systems, relevant labs, recent vital signs, mobility, intake and output, etc.  All questions were answered.  A verbalization of the patient's condition was verbalized.  Patient is being transported via [facility].  He took all his belongings with him.  An envelop with copies of the patient's MAR, AVS and H&P were given to [facility] for the facility.   
Comprehensive Nutrition Assessment    Type and Reason for Visit: Initial, RD Nutrition Re-Screen/LOS    Nutrition Recommendations/Plan:   Continue current diet, please send softer foods with meals   Ensure HP 1x/day   Continue to document % intake of meals in flowsheets        Malnutrition Assessment:  Malnutrition Status:  No malnutrition (10/28/24 1450)    Context:  Acute Illness          Nutrition Assessment:    PMH of HTN, NKA.     Presents w/ c/o HTN, dizziness, and nausea. Head CT revealing michell hemorrhage w/ some localized edema. Pt reviewed by neurosurgery, no surgical intervention recommended. S/p renal US revealing echogenic kidneys; KILEY on CKD 3.     Chart reviewed for LOS day 8. RD visited pt at bedside. Pt endorsed a decreased appetite/intake 2/2 pressure in jaw when eating; pt preferring softer foods (RD to add to diet order). However, recent intakes adequate per flowsheets (50 - 100% x past 4 doc meals). No reported wt changes, UBW of 250# per pt report. Wts trending down during admission per EMR, consider fluid shifts. H/o constipation while IP however, now resolved (LBM 10/26). Continue to monitor Bms and administer bowel regimen prn. Pt amenable to try ONS for reported poor PO, RD to add to diet order. Continue current diet.     Nutritionally Significant Medications:  Lipitor, Senokot  Prn meds: Imodium, Zofran       Estimated Daily Nutrient Needs:  Energy Requirements Based On: Kcal/kg  Weight Used for Energy Requirements: Adjusted  Energy (kcal/day): 1960 - 2352 kcal/day (25 - 30 kcal/kg adj bw)  Weight Used for Protein Requirements: Adjusted  Protein (g/day): 94 g/day (1.2 g/kg adj bw)  Method Used for Fluid Requirements: 1 ml/kcal  Fluid (ml/day): 1960 - 2352 ml/day (1lm/kcal)    Nutrition Related Findings:   Edema: Generalized   Edema Generalized: +1, Non-pitting  RUE Edema: +1     RLE Edema: +1  LLE Edema: Non-pitting    Recent Labs     10/26/24  0539 10/27/24  0739 10/28/24  0524   GLUCOSE 
Neurosurgery called several times about this patient I spoke to ICU staff about him  small pontine hemorrhage  no neurosurgical intervention necessary  defer to neurology and ICU staff  
Occupational Therapy:     Chart reviewed in prep for OT tx session. Attempted to see pt, however he politely declined 2/2 fatigue following PT session. Will continue to follow as able and appropriate.     Thank you,   MARIAH Cardoza, OTR/L    
Patient is okay to go without peripheral IV per MD Marline.   
Renal function and BP stable  Cont present care  Daily labs over weekend  Will check back Monday  Call with any issues  
SLP Contact Note    Consult received and appreciated. Patient chart reviewed. Patient with \"Stable size of acute intraparenchymal hemorrhage in the left michell measuring 1.6 x 1.0 cm, with mild surrounding edema within the left michell and extending  superiorly to the midbrain-micehll junction.\" This places patient at high risk for silent aspiration. Will plan to complete a Flexible Endoscopic Evaluation of Swallow (FEES) at bedside to objectively assess safety of swallow physiology.       Tiffany Vasquez M.Ed, PhD(c), CCC-SLP (pronouns: she/her/hers)  Speech-Language Pathologist    
Spiritual Health History and Assessment/Progress Note  [facility]    Initial Encounter,  ,  ,      Name: Stevon Callands MRN: [id number]    Age: 44 y.o.     Sex: male   Language: English   Nondenominational: Other   Hypertensive emergency     Date: 10/25/2024            Total Time Calculated: 8 min              Spiritual Assessment began in [facility] 6S NEURO-SCI TELE        Referral/Consult From: Rounding   Encounter Overview/Reason: Initial Encounter  Service Provided For: Patient    Evelyn, Belief, Meaning:   Patient unable to assess at this time patient appeared to be sleeping in the chair when the  arrived and did not respond to 's greeting. Could not visit.  Family/Friends No family/friends present      Importance and Influence:  Patient unable to assess at this time  Family/Friends No family/friends present    Community:  Patient Other: unable to assess at this time  Family/Friends No family/friends present    Assessment and Plan of Care:     Patient Interventions include: Other: unable to assess at this time  Family/Friends Interventions include: No family/friends present    Patient Plan of Care: Spiritual Care available upon further referral  Family/Friends Plan of Care: Spiritual Care available upon further referral    Electronically signed by Sabina Freire, Chaplain Resident on 10/25/2024 at 2:32 PM   
comfortable. Not in distress.        OBJECTIVE   Physical Exam  Constitutional:       Comments: [id number]   HENT:      Head: Normocephalic.   Eyes:      Pupils: Pupils are equal, round, and reactive to light.   Cardiovascular:      Rate and Rhythm: Normal rate and regular rhythm.      Pulses: Normal pulses.      Heart sounds: Normal heart sounds.   Pulmonary:      Effort: Pulmonary effort is normal.      Breath sounds: Normal breath sounds.   Abdominal:      General: Bowel sounds are normal.      Palpations: Abdomen is soft.   Skin:     General: Skin is warm and dry.   Neurological:      Mental Status: He is alert and oriented to person, place, and time.      Comments: Slurred speech  Oriented x4  Cognition intact   Psychiatric:         Mood and Affect: Mood normal.         Labs and Data: Reviewed 10/21/24  Recent Labs     10/20/24  0912 10/21/24  0453   WBC 10.6 7.5   RBC 5.00 4.41   HGB 13.0 11.4*   HCT 40.6 35.8*   MCV 81.2 81.2   RDW 13.7 13.8    283     Recent Labs     10/20/24  0912 10/20/24  1409 10/21/24  0453    142 142   K 3.0* 3.3* 3.3*    113* 112*   CO2 23 25 27   BUN 33* 30* 27*   CREATININE 3.12* 2.84* 2.80*   GLUCOSE 181* 131* 98     Recent Labs     10/20/24  0912   AST 17   ALT 18   BILITOT 0.2   ALKPHOS 77     Recent Labs     10/20/24  0912   PROTIME 13.2   INR 1.0     Medications: Reviewed 10/21/24  Imaging: Reviewed 10/21/24   Most Recent XR  XR CHEST STANDARD TWO VW 11/06/2021    Narrative  EXAM: XR CHEST PA LAT    INDICATION: Chest Pain    COMPARISON: None.    FINDINGS: PA and lateral radiographs of the chest demonstrate clear lungs. The  cardiac and mediastinal contours and pulmonary vascularity are normal. The bones  and soft tissues are within normal limits.    Impression  Normal chest.    Most Recent CT  CTA HEAD NECK W CONTRAST 10/20/2024    Narrative  EXAM:  CTA CODE NEURO HEAD AND NECK W CONT, CT BRAIN PERFUSION    INDICATION:   right sided numbness and 
to follow-up with the Neurology clinic in 4-8 weeks from stroke    No further recommendations. Please contact if any questions.   Subjective:   Patient verbalized understanding of above plan    Objective:   Chart reviewed since last seen    Current Facility-Administered Medications   Medication Dose Route Frequency    hydrALAZINE (APRESOLINE) tablet 100 mg  100 mg Oral 3 times per day    carvedilol (COREG) tablet 25 mg  25 mg Oral BID WC    niCARdipine (CARDENE) 20 mg in 0.86 % sodium chloride 200 mL infusion  2.5-15 mg/hr IntraVENous Continuous    labetalol (NORMODYNE;TRANDATE) injection 10 mg  10 mg IntraVENous Q4H PRN    amLODIPine (NORVASC) tablet 10 mg  10 mg Oral Daily    sodium chloride flush 0.9 % injection 5-40 mL  5-40 mL IntraVENous 2 times per day    sodium chloride flush 0.9 % injection 5-40 mL  5-40 mL IntraVENous PRN    ondansetron (ZOFRAN-ODT) disintegrating tablet 4 mg  4 mg Oral Q8H PRN    Or    ondansetron (ZOFRAN) injection 4 mg  4 mg IntraVENous Q6H PRN    polyethylene glycol (GLYCOLAX) packet 17 g  17 g Oral Daily PRN    [Held by provider] enoxaparin (LOVENOX) injection 40 mg  40 mg SubCUTAneous Daily    atorvastatin (LIPITOR) tablet 80 mg  80 mg Oral Nightly    acetaminophen (TYLENOL) tablet 650 mg  650 mg Oral Q6H PRN    hydrALAZINE (APRESOLINE) injection 10 mg  10 mg IntraVENous Q4H PRN       BP (!) 144/88   Pulse 83   Temp 98 °F (36.7 °C) (Oral)   Resp 22   Wt 115.2 kg (253 lb 15.5 oz)   SpO2 94%   BMI 39.86 kg/m²   Temp (24hrs), Av.2 °F (36.8 °C), Min:98 °F (36.7 °C), Max:99.2 °F (37.3 °C)      No intake/output data recorded.  10/20 1901 - 10/22 0700  In: 1543.5 [P.O.:60; I.V.:1483.5]  Out: 2800 [Urine:2800]      Physical Exam:  General: Well developed well nourished patient in no apparent distress.   Cardiac: Regular rate and rhythm   Respiratory: No increased WOB  Extremities: 2+ Radial pulses, no cyanosis or edema    Neurological Exam:  Mental Status: Oriented to time, place 
components found for: \"CULT\"      I have reviewed the flowsheets.  Chart and Pertinent Notes have been reviewed.   No change in PMH ,family and social history from Consult note.      Michael King MD  [facility]     
in patient's clinical care. Care coordination discussions were held with appropriate clinical/nonclinical/ nursing providers based on care coordination needs.     Patients current active Medications were reviewed, considered, added and adjusted based on the clinical condition today.      Home Medications were reconciled to the best of my ability given all available resources at the time of admission. Route is PO if not otherwise noted.    Medications Reviewed:     Current Facility-Administered Medications   Medication Dose Route Frequency    doxazosin (CARDURA) tablet 2 mg  2 mg Oral Nightly    loperamide (IMODIUM) capsule 4 mg  4 mg Oral 4x Daily PRN    sennosides-docusate sodium (SENOKOT-S) 8.6-50 MG tablet 1 tablet  1 tablet Oral Daily    hydrALAZINE (APRESOLINE) tablet 100 mg  100 mg Oral 3 times per day    carvedilol (COREG) tablet 25 mg  25 mg Oral BID WC    labetalol (NORMODYNE;TRANDATE) injection 10 mg  10 mg IntraVENous Q4H PRN    amLODIPine (NORVASC) tablet 10 mg  10 mg Oral Daily    sodium chloride flush 0.9 % injection 5-40 mL  5-40 mL IntraVENous 2 times per day    sodium chloride flush 0.9 % injection 5-40 mL  5-40 mL IntraVENous PRN    ondansetron (ZOFRAN-ODT) disintegrating tablet 4 mg  4 mg Oral Q8H PRN    Or    ondansetron (ZOFRAN) injection 4 mg  4 mg IntraVENous Q6H PRN    polyethylene glycol (GLYCOLAX) packet 17 g  17 g Oral Daily PRN    atorvastatin (LIPITOR) tablet 80 mg  80 mg Oral Nightly    acetaminophen (TYLENOL) tablet 650 mg  650 mg Oral Q6H PRN     ______________________________________________________________________  EXPECTED LENGTH OF STAY: 9  ACTUAL LENGTH OF STAY:          8                 Inge Horan MD     
STAY: Unable to retrieve estimated LOS  ACTUAL LENGTH OF STAY:          7                 Sanchez Buckley MD    
significant stenosis in the  cervical right internal carotid artery. There is no evidence of significant  stenosis in the cervical left internal carotid artery.     There is a slightly right dominant vertebrobasilar arterial system. The cervical  vertebral arteries are normal in course, size and contour without significant  stenosis.     Visualized soft tissues of the neck are unremarkable. Subsegmental atelectasis  in the right lower lobe. No acute fracture or aggressive osseous lesion. Mild  degenerative disc disease in the cervical spine.     CT Brain Perfusion:  There are no regional areas of elevated Tmax, decreased cerebral blood flow or  blood volume.  rCBF < 30% = 0 cc.  Tmax > 6 seconds = 0 cc.     Impression  CTA Head:  1. Stable acute intraparenchymal hemorrhage in the left michell without significant  mass effect. No evidence of aneurysm or vascular malformation.  2. No evidence of significant stenosis.     CTA Neck:  1. No evidence of significant stenosis.     CT Brain Perfusion:  1. No acute abnormality.        Electronically signed by Melvin Galindo      I have spent 45 minutes of time involved in chart review, lab review, imaging review, consultations with specialists and attendings, family discussion/decision making and documentation.     Signed By: CYNDI Travis - NP, Neurocritical Care Nurse Practitioner    October 21, 2024

## 2024-10-30 NOTE — DISCHARGE SUMMARY
HYGROTON               Where to Get Your Medications        Information about where to get these medications is not yet available    Ask your nurse or doctor about these medications  atorvastatin 80 MG tablet  hydrALAZINE 100 MG tablet  polyethylene glycol 17 g packet           NOTIFY YOUR PHYSICIAN FOR ANY OF THE FOLLOWING:   Fever over 101 degrees for 24 hours.   Chest pain, shortness of breath, fever, chills, nausea, vomiting, diarrhea, change in mentation, falling, weakness, bleeding. Severe pain or pain not relieved by medications.  Or, any other signs or symptoms that you may have questions about.    DISPOSITION:    Home With:   OT  PT  HH  RN      x Long term SNF/Inpatient Rehab    Independent/assisted living    Hospice   x Other:       PATIENT CONDITION AT DISCHARGE:     Functional status    Poor    x Deconditioned     Independent      Cognition    x Lucid     Forgetful     Dementia      Catheters/lines (plus indication)    Martinez     PICC     PEG    x None      Code status    x Full code     DNR      PHYSICAL EXAMINATION AT DISCHARGE:    General : alert x 3, awake, no acute distress,   HEENT: PEERL, EOMI, moist mucus membrane, TM clear  Neck: supple, no JVD, no meningeal signs  Chest: Clear to auscultation bilaterally   CVS: S1 S2 heard, Capillary refill less than 2 seconds  Abd: soft/ Non tender, non distended, BS physiological,   Ext: no clubbing, no cyanosis, no edema, brisk 2+ DP pulses  Neuro/Psych: pleasant mood and affect, CN 2-12 grossly intact, sensory grossly within normal limit, R mild hemiparesis , unstable gait   Skin: warm     CHRONIC MEDICAL DIAGNOSES:      Greater than 31 minutes were spent with the patient on counseling and coordination of care    Signed:   Eli Knight MD  10/30/2024  11:48 AM

## 2024-10-30 NOTE — CARE COORDINATION
Transition of Care Plan:    IPR - [facility]; DC today  RN to call report to [phone]    Transport: BLS - [facility] 3pm     RUR: 13%   Prior Level of Functioning: Independent   Disposition: IPR  If SNF or IPR: Date FOC offered: 10/23/24  Date FOC received: 10/23/24   Follow up appointments: PCP Neurology   DME needed: TBD  Transportation at discharge: Family vs BLS   IM/IMM Medicare/ letter given: NA  Is patient a  and connected with VA? No  Caregiver Contact: Mother Carol Callands [phone]  Discharge Caregiver contacted prior to discharge? No  Care Conference needed? No   Barriers to discharge:  none    [facility] has confirmed bed is available for Pt today.    CM spoke with patient. They prefer BLS transport at discharge. Patient verbalized understanding that insurance will be billed for trip, however CM is not able to guarantee 100% coverage for trip. PCS form copied and placed on patient's hard chart.    [facility] scheduled for 3pm.     Pt in agreement with discharge today.    Rosa Phelan M.S (Ally).ROSMERY.

## 2024-11-21 SDOH — HEALTH STABILITY: PHYSICAL HEALTH
ON AVERAGE, HOW MANY DAYS PER WEEK DO YOU ENGAGE IN MODERATE TO STRENUOUS EXERCISE (LIKE A BRISK WALK)?: PATIENT DECLINED

## 2024-11-22 ENCOUNTER — OFFICE VISIT (OUTPATIENT)
Facility: CLINIC | Age: 44
End: 2024-11-22

## 2024-11-22 VITALS
BODY MASS INDEX: 36.83 KG/M2 | SYSTOLIC BLOOD PRESSURE: 143 MMHG | RESPIRATION RATE: 18 BRPM | WEIGHT: 243 LBS | OXYGEN SATURATION: 98 % | HEIGHT: 68 IN | HEART RATE: 72 BPM | DIASTOLIC BLOOD PRESSURE: 80 MMHG

## 2024-11-22 DIAGNOSIS — I10 PRIMARY HYPERTENSION: ICD-10-CM

## 2024-11-22 DIAGNOSIS — E78.5 HYPERLIPIDEMIA, UNSPECIFIED HYPERLIPIDEMIA TYPE: ICD-10-CM

## 2024-11-22 DIAGNOSIS — R73.03 PREDIABETES: ICD-10-CM

## 2024-11-22 DIAGNOSIS — Z76.89 ENCOUNTER TO ESTABLISH CARE: Primary | ICD-10-CM

## 2024-11-22 DIAGNOSIS — Z86.73 HISTORY OF STROKE: ICD-10-CM

## 2024-11-22 RX ORDER — ATORVASTATIN CALCIUM 80 MG/1
80 TABLET, FILM COATED ORAL NIGHTLY
Qty: 90 TABLET | Refills: 3 | Status: SHIPPED | OUTPATIENT
Start: 2024-11-22 | End: 2025-11-17

## 2024-11-22 RX ORDER — LOSARTAN POTASSIUM 25 MG/1
12.5 TABLET ORAL DAILY
Qty: 90 TABLET | Refills: 1 | Status: SHIPPED | OUTPATIENT
Start: 2024-11-22

## 2024-11-22 RX ORDER — HYDRALAZINE HYDROCHLORIDE 50 MG/1
50 TABLET, FILM COATED ORAL 3 TIMES DAILY
COMMUNITY
Start: 2024-11-06

## 2024-11-22 RX ORDER — TIRZEPATIDE 2.5 MG/.5ML
2.5 INJECTION, SOLUTION SUBCUTANEOUS WEEKLY
Qty: 2 ML | Refills: 5 | Status: SHIPPED | OUTPATIENT
Start: 2024-11-22

## 2024-11-22 SDOH — ECONOMIC STABILITY: INCOME INSECURITY: HOW HARD IS IT FOR YOU TO PAY FOR THE VERY BASICS LIKE FOOD, HOUSING, MEDICAL CARE, AND HEATING?: NOT HARD AT ALL

## 2024-11-22 SDOH — ECONOMIC STABILITY: FOOD INSECURITY: WITHIN THE PAST 12 MONTHS, THE FOOD YOU BOUGHT JUST DIDN'T LAST AND YOU DIDN'T HAVE MONEY TO GET MORE.: NEVER TRUE

## 2024-11-22 SDOH — ECONOMIC STABILITY: FOOD INSECURITY: WITHIN THE PAST 12 MONTHS, YOU WORRIED THAT YOUR FOOD WOULD RUN OUT BEFORE YOU GOT MONEY TO BUY MORE.: NEVER TRUE

## 2024-11-22 ASSESSMENT — PATIENT HEALTH QUESTIONNAIRE - PHQ9
1. LITTLE INTEREST OR PLEASURE IN DOING THINGS: NOT AT ALL
SUM OF ALL RESPONSES TO PHQ QUESTIONS 1-9: 0
SUM OF ALL RESPONSES TO PHQ QUESTIONS 1-9: 0
SUM OF ALL RESPONSES TO PHQ9 QUESTIONS 1 & 2: 0
2. FEELING DOWN, DEPRESSED OR HOPELESS: NOT AT ALL
SUM OF ALL RESPONSES TO PHQ QUESTIONS 1-9: 0
SUM OF ALL RESPONSES TO PHQ QUESTIONS 1-9: 0

## 2024-11-22 ASSESSMENT — ENCOUNTER SYMPTOMS
SORE THROAT: 0
ABDOMINAL PAIN: 0
SHORTNESS OF BREATH: 0
SINUS PAIN: 0
DIARRHEA: 0
CONSTIPATION: 0
RHINORRHEA: 0
ALLERGIC/IMMUNOLOGIC NEGATIVE: 1
COUGH: 0
EYES NEGATIVE: 1

## 2024-11-22 NOTE — PATIENT INSTRUCTIONS
average blood sugar by approximately 17%.    With this new information my recommendation is that a for 7-10,000 steps a day and standing every 30 minutes if possible and walking for 2 minutes.    GLP-1 (glucagon-like peptide) medications are commonly used for diabetes management as well as weight loss. They have been around a long time (first one was FDA-approved in 2005) but have been updated and refined more recently to be safer and more effective. They work in various ways, but usually lead to decreased gastric emptying which means it forces your body to digest meals more slowly and therefore reduces the amount of calories you need to feel full and satisfied. This generally leads to weight loss, which can sometimes be quite profound. It also works in the pancreas to help your body produce insulin more efficiently, which helps lower blood glucose in someone with diabetes. More recent research has now shown that long-term use of these medications also reduces your risk of developing heart disease or kidney failure, as well as reducing high blood pressure, improving cholesterol and others. The most common side effects including nausea, vomiting, loss of appetite and GI disturbances. These are usually worst when you start or increase the dose, but typically get better with time. Although very rare, pancreatitis has been associated with these meds and that would require hospitalization. Most of these are available through injection form with a tiny needle that is typically painless. Areas of your body you can give the injections include your belly, outer thighs, upper buttocks and the backs of your arms. There are more and more options becoming available each year, but here is a list of what's out right now:    FDA-approved for obesity / weight loss:  Wegovy (same as Ozempic - Semaglutide) - injection once a week.  Zepbound (same as Mounjaro - Tirzepatide) - injection once a week.    FDA-approved for Diabetes

## 2024-11-22 NOTE — PROGRESS NOTES
Verified name and birth date for privacy precautions.   Chart reviewed in preparation for today's visit.     Chief Complaint   Patient presents with    Establish Care     Prev PCP was Dequan BLAKE 05/2022.     Follow-Up from Hospital     Pt was evaluated at [facility] 10.20 - 10.30.2024 for hypertensive emergency.   Pt has appt scheduled with neurology 11. 25.2024 & nephrology 12.18.2024           Health Maintenance Due   Topic    Depression Screen     HIV screen     Hepatitis C screen     Hepatitis B vaccine (1 of 3 - 19+ 3-dose series)    Colorectal Cancer Screen     Flu vaccine (1)    COVID-19 Vaccine (1 - 2023-24 season)         Vitals:    11/22/24 1505   BP: (!) 143/80   Site: Left Upper Arm   Position: Sitting   Cuff Size: Large Adult   Pulse: 72   Resp: 18   SpO2: 98%   Weight: 110.2 kg (243 lb)   Height: 1.727 m (5' 8\")           Depression Screening:  :         11/22/2024     3:06 PM   PHQ-9    Little interest or pleasure in doing things 0   Feeling down, depressed, or hopeless 0   PHQ-2 Score 0   PHQ-9 Total Score 0       SDOH Screening:  :     Social Determinants of Health     Tobacco Use: Low Risk  (11/22/2024)    Patient History     Smoking Tobacco Use: Never     Smokeless Tobacco Use: Never     Passive Exposure: Not on file   Alcohol Use: Not At Risk (10/30/2024)    AUDIT-C     Frequency of Alcohol Consumption: Never     Average Number of Drinks: Patient does not drink     Frequency of Binge Drinking: Never   Financial Resource Strain: Low Risk  (11/22/2024)    Overall Financial Resource Strain (CARDIA)     Difficulty of Paying Living Expenses: Not hard at all   Food Insecurity: No Food Insecurity (11/22/2024)    Hunger Vital Sign     Worried About Running Out of Food in the Last Year: Never true     Ran Out of Food in the Last Year: Never true   Transportation Needs: No Transportation Needs (11/22/2024)    PRAPARE - Transportation     Lack of Transportation (Medical): No     Lack of Transportation 
patient  Patient Labs were reviewed and or requested:  Patient Past Records were reviewed and or requested    We discussed the expected course, resolution and complications of the diagnosis(es) in detail.  Medication risks, benefits, costs, interactions, and alternatives were discussed as indicated.  I advised him to contact the office if his condition worsens, changes or fails to improve as anticipated. He expressed understanding with the diagnosis(es) and plan.     Please note that this dictation was completed with [employer], the computer voice recognition software and [employer] intellience software.  Quite often unanticipated grammatical, syntax, homophones, and other interpretive errors are inadvertently transcribed by the computer software.  Please disregard these errors.  Please excuse any errors that have escaped final proofreading.  Thank you.     SERGE Diaz.    Patient Instructions   General information about the practice.    We here at [facility] to ensure that our patients receive the best quality care possible.  Noted to help aid in this process we allow patients to see other providers in the office when they have a chronic disease that needs to be managed but is unable to get an appointment in with their current PCP.  We generally request that they see the other provider and then follow-up with their primary care provider.  This is to ensure that you always get seen in a timely fashion to get your needs met.    For my patients I recommend that if you have something that comes up that you feel needs an appointment and are unable to get on my schedule.  Please reach out to me prior to scheduling with anybody else to see if I can fit you in as a double book.  The appointment would be to speak about the issue and only the issue for that appointment.    To also aid in convenience and care for you we have Elvie Mckinnon on the second floor who is our acute care provider.  She can see

## 2024-11-22 NOTE — ASSESSMENT & PLAN NOTE
Chronic, not at goal (unstable),  The below medication was prescribed.  Common side effects were discussed with the patient.  Concerning symptoms related to the condition and the medication were discussed.  Signs and symptoms that would require evaluation by an emergency room were discussed.  Discussed patient's ability to reach out via MyChart and/or phone call if they develop any side effects associated with the medication.      Orders:    tirzepatide-weight management (ZEPBOUND) 2.5 MG/0.5ML SOLN subCUTAneous injection (VIAL); Inject 0.5 mLs into the skin once a week

## 2024-11-22 NOTE — ASSESSMENT & PLAN NOTE
Follow-up in 4 weeks to reevaluate blood pressure for control.The below medication was prescribed.  Common side effects were discussed with the patient.  Concerning symptoms related to the condition and the medication were discussed.  Signs and symptoms that would require evaluation by an emergency room were discussed.  Discussed patient's ability to reach out via MyChart and/or phone call if they develop any side effects associated with the medication.      Orders:    losartan (COZAAR) 25 MG tablet; Take 0.5 tablets by mouth daily

## 2024-11-22 NOTE — ASSESSMENT & PLAN NOTE
Chronic, not at goal (unstable), refilled the medication and stated the patient will follow-up in 3 months from the next appointment to see how his cholesterol has improved after being on the medication consistently    Orders:    atorvastatin (LIPITOR) 80 MG tablet; Take 1 tablet by mouth nightly

## 2024-11-25 ENCOUNTER — OFFICE VISIT (OUTPATIENT)
Age: 44
End: 2024-11-25
Payer: COMMERCIAL

## 2024-11-25 VITALS
BODY MASS INDEX: 36.95 KG/M2 | WEIGHT: 243.8 LBS | HEART RATE: 74 BPM | SYSTOLIC BLOOD PRESSURE: 138 MMHG | HEIGHT: 68 IN | RESPIRATION RATE: 16 BRPM | DIASTOLIC BLOOD PRESSURE: 64 MMHG | OXYGEN SATURATION: 97 %

## 2024-11-25 DIAGNOSIS — I61.9 INTRAPARENCHYMAL HEMORRHAGE OF BRAIN (HCC): ICD-10-CM

## 2024-11-25 DIAGNOSIS — N18.9 CHRONIC KIDNEY DISEASE, UNSPECIFIED CKD STAGE: ICD-10-CM

## 2024-11-25 DIAGNOSIS — I10 HYPERTENSION, UNSPECIFIED TYPE: ICD-10-CM

## 2024-11-25 DIAGNOSIS — R29.818 SUSPECTED SLEEP APNEA: ICD-10-CM

## 2024-11-25 DIAGNOSIS — Z09 HOSPITAL DISCHARGE FOLLOW-UP: Primary | ICD-10-CM

## 2024-11-25 PROCEDURE — 99215 OFFICE O/P EST HI 40 MIN: CPT

## 2024-11-25 PROCEDURE — 3078F DIAST BP <80 MM HG: CPT

## 2024-11-25 PROCEDURE — 3075F SYST BP GE 130 - 139MM HG: CPT

## 2024-11-25 ASSESSMENT — PATIENT HEALTH QUESTIONNAIRE - PHQ9
SUM OF ALL RESPONSES TO PHQ9 QUESTIONS 1 & 2: 0
SUM OF ALL RESPONSES TO PHQ QUESTIONS 1-9: 0
SUM OF ALL RESPONSES TO PHQ QUESTIONS 1-9: 0
2. FEELING DOWN, DEPRESSED OR HOPELESS: NOT AT ALL
SUM OF ALL RESPONSES TO PHQ QUESTIONS 1-9: 0
SUM OF ALL RESPONSES TO PHQ QUESTIONS 1-9: 0
1. LITTLE INTEREST OR PLEASURE IN DOING THINGS: NOT AT ALL

## 2024-11-25 NOTE — PROGRESS NOTES
advance as tolerated, as well as resume work in IT. He may follow up with Neurology as needed.     Plan:  Start outpatient PT  Resume driving, slowly, as above  F/u prn    I spent 48 minutes of time today reviewing the medical record and notes, imaging, examining the patient, and face-to-face time, patient/family teaching and medication side effects, and time spent completing documentation. Time excludes separately billed procedures.       CYNDI PEACOCK - NP

## 2024-11-26 ENCOUNTER — CLINICAL DOCUMENTATION (OUTPATIENT)
Facility: HOSPITAL | Age: 44
End: 2024-11-26

## 2024-11-26 DIAGNOSIS — Z86.73 HISTORY OF STROKE: ICD-10-CM

## 2024-11-26 DIAGNOSIS — E78.5 HYPERLIPIDEMIA, UNSPECIFIED HYPERLIPIDEMIA TYPE: ICD-10-CM

## 2024-11-26 DIAGNOSIS — R73.03 PREDIABETES: ICD-10-CM

## 2024-11-26 DIAGNOSIS — I10 PRIMARY HYPERTENSION: ICD-10-CM

## 2024-11-26 NOTE — PROGRESS NOTES
[facility] at [facility]  Specialty Pharmacy Update    Date: 11/26/24    Stevon Callands 1980    Medication: Zepbound    Prior authorization was denied by insurance.     The patient's insurance requires a try and fail on BOTH Saxenda and Wegovy before they will consider Zepbound.     Nicole Ibarra  [facility] at [address], [address]   [place] [address]  phone: [phone]   fax: [phone]

## 2024-11-27 ENCOUNTER — CLINICAL DOCUMENTATION (OUTPATIENT)
Facility: HOSPITAL | Age: 44
End: 2024-11-27

## 2024-11-27 NOTE — PROGRESS NOTES
Sent in the prescription for the maintenance dose of Saxenda.  Called and spoke to the patient and informed him to start the medication at the end of his starter pack.  Follow-up appointment is set for December 20, 2024.

## 2024-11-27 NOTE — PROGRESS NOTES
[facility] at [facility]  Specialty Pharmacy Update    Date: 11/27/24    Stevon Callands 1980     Medication: Saxenda    Prior Authorization: Approved through May 26/2025    Copay: $180.00    Nicole Ibarra  [facility] at [place] [id number]  phone: [phone]   fax: [phone]

## 2024-12-03 ENCOUNTER — CLINICAL DOCUMENTATION (OUTPATIENT)
Facility: CLINIC | Age: 44
End: 2024-12-03

## 2024-12-12 ENCOUNTER — OFFICE VISIT (OUTPATIENT)
Age: 44
End: 2024-12-12
Payer: COMMERCIAL

## 2024-12-12 VITALS
WEIGHT: 242 LBS | HEART RATE: 73 BPM | TEMPERATURE: 98.1 F | HEIGHT: 68 IN | SYSTOLIC BLOOD PRESSURE: 110 MMHG | DIASTOLIC BLOOD PRESSURE: 74 MMHG | OXYGEN SATURATION: 99 % | BODY MASS INDEX: 36.68 KG/M2

## 2024-12-12 DIAGNOSIS — I61.3 PONTINE HEMORRHAGE (HCC): Primary | ICD-10-CM

## 2024-12-12 PROCEDURE — 3078F DIAST BP <80 MM HG: CPT | Performed by: RADIOLOGY

## 2024-12-12 PROCEDURE — 3074F SYST BP LT 130 MM HG: CPT | Performed by: RADIOLOGY

## 2024-12-12 PROCEDURE — 99205 OFFICE O/P NEW HI 60 MIN: CPT | Performed by: RADIOLOGY

## 2024-12-12 NOTE — PROGRESS NOTES
Clinic Note      Patient: Stevon Callands MRN: [id number]  SSN: [id number]    YOB: 1980  Age: 44 y.o.  Sex: male      Subjective:      Stevon Callands is a 44 y.o. male new patient with history of hypertension, hyperlipidemia, prediabetes, and recent pontine hemorrhage who presents on referral from TIFFANIE Sanon for evaluation for a possible arteriovenous malformation.    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    Past Medical History:   Diagnosis Date    Cerebral artery occlusion with cerebral infarction (HCC) 10/20/2024    Hypertension      History reviewed. No pertinent surgical history.   Family History   Family history unknown: Yes     Social History     Tobacco Use    Smoking status: Never    Smokeless tobacco: Never   Substance Use Topics    Alcohol use: Not Currently      Current Outpatient Medications   Medication Sig Dispense Refill    liraglutide-weight management 18 MG/3ML SOPN Inject 3 mg into the skin daily Start when completed starter pack 15 mL 5    liraglutide-weight management 18 MG/3ML SOPN Inject 0.6 mg into the skin daily for 7 days, THEN 1.2 mg daily for 7 days, THEN 1.8 mg daily for 7 days, THEN 2.4 mg daily for 7 days, THEN 3 mg daily for 7 days. 5 Adjustable Dose Pre-filled Pen Syringe 1    hydrALAZINE (APRESOLINE) 50 MG tablet Take 1 tablet by mouth 3 times daily      atorvastatin (LIPITOR) 80 MG tablet Take 1 tablet by mouth nightly 90 tablet 3    losartan (COZAAR) 25 MG tablet Take 0.5 tablets by mouth daily 90 tablet 1    amLODIPine (NORVASC) 10 MG tablet amlodipine 10 mg tablet   TAKE 1 TABLET BY MOUTH DAILY      carvedilol (COREG) 25 MG tablet Take 1 tablet by mouth 2 times daily      doxazosin (CARDURA) 2 MG tablet Take 1 tablet by mouth nightly       No current facility-administered medications for this visit.        No Known Allergies    Review of

## 2024-12-12 NOTE — PROGRESS NOTES
New patient referred by MUKESH Sanon presenting with possible AVM, s/p ICH.  Patient reports no symptoms.  Reports no residual effects from stroke.  No new problems reported.

## 2024-12-20 ENCOUNTER — OFFICE VISIT (OUTPATIENT)
Facility: CLINIC | Age: 44
End: 2024-12-20

## 2024-12-20 VITALS
HEIGHT: 68 IN | TEMPERATURE: 98.1 F | DIASTOLIC BLOOD PRESSURE: 67 MMHG | BODY MASS INDEX: 36.98 KG/M2 | OXYGEN SATURATION: 100 % | HEART RATE: 74 BPM | WEIGHT: 244 LBS | RESPIRATION RATE: 15 BRPM | SYSTOLIC BLOOD PRESSURE: 131 MMHG

## 2024-12-20 DIAGNOSIS — I61.3 PONTINE HEMORRHAGE (HCC): ICD-10-CM

## 2024-12-20 DIAGNOSIS — R42 DIZZINESS: ICD-10-CM

## 2024-12-20 DIAGNOSIS — R73.03 PREDIABETES: ICD-10-CM

## 2024-12-20 DIAGNOSIS — Z00.01 ENCOUNTER FOR GENERAL ADULT MEDICAL EXAMINATION WITH ABNORMAL FINDINGS: Primary | ICD-10-CM

## 2024-12-20 DIAGNOSIS — I10 PRIMARY HYPERTENSION: ICD-10-CM

## 2024-12-20 DIAGNOSIS — N18.9 CHRONIC KIDNEY FAILURE, UNSPECIFIED STAGE: ICD-10-CM

## 2024-12-20 DIAGNOSIS — E78.5 HYPERLIPIDEMIA, UNSPECIFIED HYPERLIPIDEMIA TYPE: ICD-10-CM

## 2024-12-20 SDOH — ECONOMIC STABILITY: FOOD INSECURITY: WITHIN THE PAST 12 MONTHS, THE FOOD YOU BOUGHT JUST DIDN'T LAST AND YOU DIDN'T HAVE MONEY TO GET MORE.: NEVER TRUE

## 2024-12-20 SDOH — ECONOMIC STABILITY: INCOME INSECURITY: HOW HARD IS IT FOR YOU TO PAY FOR THE VERY BASICS LIKE FOOD, HOUSING, MEDICAL CARE, AND HEATING?: NOT HARD AT ALL

## 2024-12-20 SDOH — ECONOMIC STABILITY: FOOD INSECURITY: WITHIN THE PAST 12 MONTHS, YOU WORRIED THAT YOUR FOOD WOULD RUN OUT BEFORE YOU GOT MONEY TO BUY MORE.: NEVER TRUE

## 2024-12-20 ASSESSMENT — ENCOUNTER SYMPTOMS
EYES NEGATIVE: 1
SINUS PAIN: 0
CONSTIPATION: 0
ALLERGIC/IMMUNOLOGIC NEGATIVE: 1
SHORTNESS OF BREATH: 0
DIARRHEA: 0
ABDOMINAL PAIN: 0
RHINORRHEA: 0
COUGH: 0
SORE THROAT: 0

## 2024-12-20 ASSESSMENT — PATIENT HEALTH QUESTIONNAIRE - PHQ9
SUM OF ALL RESPONSES TO PHQ QUESTIONS 1-9: 0
1. LITTLE INTEREST OR PLEASURE IN DOING THINGS: NOT AT ALL
2. FEELING DOWN, DEPRESSED OR HOPELESS: NOT AT ALL
SUM OF ALL RESPONSES TO PHQ9 QUESTIONS 1 & 2: 0
SUM OF ALL RESPONSES TO PHQ QUESTIONS 1-9: 0

## 2024-12-20 NOTE — PATIENT INSTRUCTIONS
You should purchase a blood pressure cuff to check your blood pressure at home.    Check first thing in the morning.  You should be relaxed, seated with back supported, feet flat on the floor, arm with cuff resting at heart height.  You should check your blood pressure 1-3 times.  Please write down your blood pressures and bring this record and your blood pressure cuff/machine to your follow-up visit.     I would like for your blood pressure to be less than 130 for the top number and less than 90 for the bottom number.   Please follow-up sooner for consistently high blood pressure readings at home.    BP Log  Check blood pressure twice a day for 7 days. Send me results after 7 days

## 2024-12-20 NOTE — PROGRESS NOTES
Chief Complaint   Patient presents with    Hypertension    Forms     Disability: [facility]. 10/20/24     \"Have you been to the ER, urgent care clinic since your last visit?  Hospitalized since your last visit?\"    NO    “Have you seen or consulted any other health care providers outside our system since your last visit?”    NO      “Have you had a colorectal cancer screening such as a colonoscopy/FIT/Cologuard?    NO    No colonoscopy on file  No cologuard on file  No FIT/FOBT on file   No flexible sigmoidoscopy on file                Stevon Callands (:  1980) is a 44 y.o. male, here for evaluation of the following chief complaint(s):  Hypertension and Forms (Disability: [facility]. 10/20/24)        Subjective   History of Present Illness  The patient presents for evaluation of lower extremity edema, hypertension, pontine hemorrhage, and short-term disability form.    He reports experiencing severe edema in his legs, which has been persistent since his discharge from the hospital. He was informed by his nephrologist that this could be a side effect of amlodipine. He is not on any fluid restrictions.    He was admitted to [facility] on 10/20/2024 due to symptoms of nausea, dizziness, and imbalance. During his hospital stay, he was diagnosed with a pontine hemorrhage. He has since followed up with neurology, where an MRI was performed, and a referral for an angiogram was made. He reports no recurrence of these symptoms. He also reports no chest pain, shortness of breath, difficulty with stair climbing, or orthopnea. Additionally, he does not experience any episodes of dizziness or lightheadedness during ambulation.    He has brought a short-term disability form for completion. 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 He has already returned to work but is planning for potential future time off. He resumed work on 2024.    Supplemental Information  He has not yet undergone a sleep

## 2024-12-20 NOTE — PROGRESS NOTES
Chief Complaint   Patient presents with    Hypertension    Forms     Disability: [facility] 10/20/24     \"Have you been to the ER, urgent care clinic since your last visit?  Hospitalized since your last visit?\"    NO    “Have you seen or consulted any other health care providers outside our system since your last visit?”    NO      “Have you had a colorectal cancer screening such as a colonoscopy/FIT/Cologuard?    NO    No colonoscopy on file  No cologuard on file  No FIT/FOBT on file   No flexible sigmoidoscopy on file

## 2024-12-30 ENCOUNTER — CLINICAL DOCUMENTATION (OUTPATIENT)
Facility: CLINIC | Age: 44
End: 2024-12-30

## 2025-01-06 RX ORDER — FLURBIPROFEN SODIUM 0.3 MG/ML
SOLUTION/ DROPS OPHTHALMIC
Qty: 100 EACH | Refills: 0 | Status: SHIPPED | OUTPATIENT
Start: 2025-01-06

## 2025-01-08 DIAGNOSIS — R73.03 PREDIABETES: Primary | ICD-10-CM

## 2025-01-09 ENCOUNTER — CLINICAL DOCUMENTATION (OUTPATIENT)
Facility: CLINIC | Age: 45
End: 2025-01-09

## 2025-01-10 ENCOUNTER — CLINICAL DOCUMENTATION (OUTPATIENT)
Facility: CLINIC | Age: 45
End: 2025-01-10

## 2025-01-10 NOTE — PROGRESS NOTES
Mitzi Tobar Parada/[employer]. Requested Medical Records from [facility] @ [phone].

## 2025-01-16 ENCOUNTER — TELEPHONE (OUTPATIENT)
Age: 45
End: 2025-01-16

## 2025-01-16 NOTE — TELEPHONE ENCOUNTER
I left a message on patient's mobile number asking if he has made a decision regarding a DSA.  I requested a call back.

## 2025-01-29 ENCOUNTER — TELEPHONE (OUTPATIENT)
Age: 45
End: 2025-01-29

## 2025-01-29 NOTE — TELEPHONE ENCOUNTER
Called patient and let him know that we were reaching out to see if he had made a decision regarding moving forward with a diagnostic cerebral angiogram?    Patient stated that he is not moving forward at this time.  He is in the process of seeking a second opinion.      I let the patient know that we understand, and we will remove him from our call list. If he wants to move anything forward with Dr. Lay in the future, please reach out to our office and we will be happy to assist him.    Patient acknowledged understanding.

## 2025-02-24 ENCOUNTER — TELEPHONE (OUTPATIENT)
Facility: CLINIC | Age: 45
End: 2025-02-24

## 2025-03-03 RX ORDER — HYDRALAZINE HYDROCHLORIDE 50 MG/1
50 TABLET, FILM COATED ORAL 3 TIMES DAILY
Qty: 270 TABLET | Refills: 1 | Status: SHIPPED | OUTPATIENT
Start: 2025-03-03 | End: 2025-08-30

## 2025-05-15 RX ORDER — DOXAZOSIN 2 MG/1
2 TABLET ORAL NIGHTLY
Qty: 30 TABLET | Refills: 1 | Status: SHIPPED | OUTPATIENT
Start: 2025-05-15

## 2025-05-15 RX ORDER — CARVEDILOL 25 MG/1
25 TABLET ORAL 2 TIMES DAILY
Qty: 60 TABLET | Refills: 1 | Status: SHIPPED | OUTPATIENT
Start: 2025-05-15

## 2025-06-25 ENCOUNTER — OFFICE VISIT (OUTPATIENT)
Facility: CLINIC | Age: 45
End: 2025-06-25
Payer: COMMERCIAL

## 2025-06-25 VITALS
HEART RATE: 64 BPM | RESPIRATION RATE: 18 BRPM | OXYGEN SATURATION: 100 % | DIASTOLIC BLOOD PRESSURE: 71 MMHG | TEMPERATURE: 98.7 F | SYSTOLIC BLOOD PRESSURE: 114 MMHG | WEIGHT: 243 LBS | BODY MASS INDEX: 36.83 KG/M2 | HEIGHT: 68 IN

## 2025-06-25 DIAGNOSIS — R73.03 PREDIABETES: ICD-10-CM

## 2025-06-25 PROCEDURE — 3074F SYST BP LT 130 MM HG: CPT

## 2025-06-25 PROCEDURE — 99213 OFFICE O/P EST LOW 20 MIN: CPT

## 2025-06-25 PROCEDURE — 3078F DIAST BP <80 MM HG: CPT

## 2025-06-25 SDOH — ECONOMIC STABILITY: FOOD INSECURITY: WITHIN THE PAST 12 MONTHS, THE FOOD YOU BOUGHT JUST DIDN'T LAST AND YOU DIDN'T HAVE MONEY TO GET MORE.: NEVER TRUE

## 2025-06-25 SDOH — ECONOMIC STABILITY: FOOD INSECURITY: WITHIN THE PAST 12 MONTHS, YOU WORRIED THAT YOUR FOOD WOULD RUN OUT BEFORE YOU GOT MONEY TO BUY MORE.: NEVER TRUE

## 2025-06-25 ASSESSMENT — ENCOUNTER SYMPTOMS
CONSTIPATION: 0
COUGH: 0
EYES NEGATIVE: 1
SORE THROAT: 0
ALLERGIC/IMMUNOLOGIC NEGATIVE: 1
RHINORRHEA: 0
ABDOMINAL PAIN: 0
SINUS PAIN: 0
DIARRHEA: 0
SHORTNESS OF BREATH: 0

## 2025-06-25 ASSESSMENT — PATIENT HEALTH QUESTIONNAIRE - PHQ9
SUM OF ALL RESPONSES TO PHQ QUESTIONS 1-9: 0
2. FEELING DOWN, DEPRESSED OR HOPELESS: NOT AT ALL
SUM OF ALL RESPONSES TO PHQ QUESTIONS 1-9: 0
1. LITTLE INTEREST OR PLEASURE IN DOING THINGS: NOT AT ALL

## 2025-06-25 NOTE — PROGRESS NOTES
Patient here for weight management, med refill.     Have you been to the ER, urgent care clinic since your last visit?  Hospitalized since your last visit?   NO    Have you seen or consulted any other health care providers outside our system since your last visit?   NO      “Have you had a colorectal cancer screening such as a colonoscopy/FIT/Cologuard?    NO    No colonoscopy on file  No cologuard on file  No FIT/FOBT on file   No flexible sigmoidoscopy on file                  Stevon Callands (:  1980) is a 45 y.o. male, here for evaluation of the following chief complaint(s):  Weight Management and Referral - General (Referral for colon cancer screening)        Subjective   History of Present Illness  The patient presents for a weight check.    He reports satisfactory progress with his current weight management regimen, which includes an 18 mg injection. He expresses a desire to increase his physical activity, particularly through walking exercises. He is seeking a refill of his weight loss medication.    He recently underwent blood work under the supervision of Dr. New, who also conducted tests to assess his kidney function. No alterations were made to his treatment plan following these tests. He does not require any refills of his regular maintenance medications at this time.    He reports no chest pain, shortness of breath, lumps, bumps, tenderness, constipation, or diarrhea.    Review of Systems   Constitutional:  Negative for activity change, appetite change and fatigue.   HENT:  Negative for postnasal drip, rhinorrhea, sinus pain and sore throat.    Eyes: Negative.    Respiratory:  Negative for cough and shortness of breath.    Cardiovascular:  Negative for chest pain.   Gastrointestinal:  Negative for abdominal pain, constipation and diarrhea.   Endocrine: Negative.    Musculoskeletal:  Negative for arthralgias, joint swelling and myalgias.   Skin: Negative.    Allergic/Immunologic: Negative.

## 2025-06-25 NOTE — PROGRESS NOTES
Patient here for weight management, med refill.     Have you been to the ER, urgent care clinic since your last visit?  Hospitalized since your last visit?   NO    Have you seen or consulted any other health care providers outside our system since your last visit?   NO      “Have you had a colorectal cancer screening such as a colonoscopy/FIT/Cologuard?    NO    No colonoscopy on file  No cologuard on file  No FIT/FOBT on file   No flexible sigmoidoscopy on file

## 2025-07-21 RX ORDER — CARVEDILOL 25 MG/1
25 TABLET ORAL 2 TIMES DAILY
Qty: 180 TABLET | Refills: 3 | Status: SHIPPED | OUTPATIENT
Start: 2025-07-21

## 2025-07-21 RX ORDER — DOXAZOSIN 2 MG/1
2 TABLET ORAL NIGHTLY
Qty: 90 TABLET | Refills: 3 | Status: SHIPPED | OUTPATIENT
Start: 2025-07-21

## 2025-08-08 PROBLEM — Z86.73 HISTORY OF STROKE: Status: ACTIVE | Noted: 2024-10-20

## 2025-08-13 ENCOUNTER — TRANSCRIBE ORDERS (OUTPATIENT)
Facility: HOSPITAL | Age: 45
End: 2025-08-13

## 2025-08-13 DIAGNOSIS — R80.9 PROTEINURIA, UNSPECIFIED TYPE: ICD-10-CM

## 2025-08-13 DIAGNOSIS — N18.4 CHRONIC KIDNEY DISEASE, STAGE 4 (SEVERE) (HCC): Primary | ICD-10-CM
